# Patient Record
Sex: FEMALE | Race: WHITE
[De-identification: names, ages, dates, MRNs, and addresses within clinical notes are randomized per-mention and may not be internally consistent; named-entity substitution may affect disease eponyms.]

---

## 2017-01-14 ENCOUNTER — HOSPITAL ENCOUNTER (EMERGENCY)
Dept: HOSPITAL 62 - ER | Age: 32
Discharge: HOME | End: 2017-01-14
Payer: SELF-PAY

## 2017-01-14 VITALS — SYSTOLIC BLOOD PRESSURE: 95 MMHG | DIASTOLIC BLOOD PRESSURE: 64 MMHG
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
## 2017-01-14 DIAGNOSIS — M23.92: ICD-10-CM

## 2017-01-14 DIAGNOSIS — F17.200: ICD-10-CM

## 2017-01-14 DIAGNOSIS — Z98.51: ICD-10-CM

## 2017-01-14 DIAGNOSIS — S93.422A: Primary | ICD-10-CM
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
## 2017-01-14 DIAGNOSIS — M25.552: ICD-10-CM
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
## 2017-01-14 PROCEDURE — 99283 EMERGENCY DEPT VISIT LOW MDM: CPT

## 2017-01-14 PROCEDURE — 73562 X-RAY EXAM OF KNEE 3: CPT

## 2017-01-14 PROCEDURE — 73610 X-RAY EXAM OF ANKLE: CPT

## 2017-01-14 NOTE — ER DOCUMENT REPORT
HPI





- HPI


Patient complains to provider of: knee pain


Pain Level: 4


Context: 


Patient is a 31-year-old female presents emergency Department complaining of 

left hip knee and ankle pain.  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 

work 2 days ago.  States she had a twisting injury to her left knee as she was 

falling and landed on her left hip.  Patient states that she has a history of a 

torn meniscus in her left knee that she has never had surgery on.  She is 

unaware if it is either lateral or medial.  Patient states at home she has been 

taking Tylenol and using a heating pad with minimal improvement in her symptoms.











- CARDIOVASCULAR


Cardiovascular: DENIES: Chest pain





- REPRODUCTIVE


Reproductive: DENIES: Pregnant:





- DERM


Skin Color: Normal





Past Medical History





- General


Information source: Patient





- Social History


Smoking Status: Current Every Day Smoker


Chew tobacco use (# tins/day): No


Frequency of alcohol use: None


Drug Abuse: None


Family History: Reviewed & Not Pertinent


Patient has suicidal ideation: No


Patient has homicidal ideation: No





- Past Medical History


Cardiac Medical History: 


   Denies: Hx Pulmonary Embolism


Pulmonary Medical History: Reports: Hx Asthma - exercise induced


   Denies: Hx Sleep Apnea, Hx Tuberculosis


Renal/ Medical History: Reports: Hx Ovarian Cysts.  Denies: Hx Peritoneal 

Dialysis


GI Medical History: Reports: Hx Gastroesophageal Reflux Disease, Hx Ulcer


Musculoskeltal Medical History: Reports Hx Musculoskeletal Deformity, Reports 

Hx Musculoskeletal Trauma


Psychiatric Medical History: Reports: Hx Attention Deficit Hyperactivity 

Disorder, Hx Bipolar Disorder - age 10, Hx Personality Disorder, Hx Post 

Traumatic Stress Disorder, Hx Schizophrenia


   Denies: Hx Depression


Past Surgical History: Reports: Hx Breast Surgery, Hx Cardiac Surgery, Hx 

 Section - x2, Hx Tubal Ligation.  Denies: Hx Hysterectomy, Hx Pacemaker





- Immunizations


Immunizations up to date: Yes


Hx Diphtheria, Pertussis, Tetanus Vaccination: Yes - received in 


Hx Pneumococcal Vaccination: 12





Vertical Provider Document





- CONSTITUTIONAL


Agree With Documented VS: Yes


Exam Limitations: No Limitations


General Appearance: Mild Distress





- INFECTION CONTROL


TRAVEL OUTSIDE OF THE [place] IN LAST 30 DAYS: No





- HEENT


HEENT: Atraumatic, Normocephalic





- RESPIRATORY


O2 Sat by Pulse Oximetry: 97





- CARDIOVASCULAR


Pulses: Normal: Radial, Popliteal, Dorsalis pedis


Notes: 


cap refill less than 2 seconds in bilateral lower extremity digits





- BACK


Back: Normal Inspection - nontender





- MUSCULOSKELETAL/EXTREMETIES


Musculoskeletal/Extremeties: MAEW, FROM, Non-Tender - Tenderness to palpation 

with guarding.  Pain worse on the medial aspect of the left knee.  No pain to 

palpation of the ankle.  No pain to palpation of the hip


Notes: 


Able to ambulate with limping.  But able to bear weight.  Negative anterior and 

posterior drawer.  Negative varus stress but positive valgus stress of the 

medial aspect of the knee.  Positive Apley at the medial meniscus.  Minor joint 

swelling of the left knee





- NEURO


Level of Consciousness: Awake, Alert, Appropriate


Motor/Sensory: No Motor Deficit, No Sensory Deficit





- DERM


Integumentary: Warm, Dry, No Rash





Course





- Re-evaluation


Re-evalutation: 





17 13:41


Patient's history and physical exam reveal internal knee injury of the left 

knee.  Likely sprain of the left ankle.  Patient be discharged home with 

instruction for ice/heat as well as over-the-counter NSAIDs for pain management 

encouraged to take some time off work given the physical nature of her job as a 

dancer.  Encouraged to follow-up with orthopedics for further evaluation





- Vital Signs


Vital signs: 


 











Temp Pulse Resp BP Pulse Ox


 


 98.2 F   103 H  20   105/72   97 


 


 17 12:34  17 12:34  17 12:34  17 12:34  17 12:34














- Diagnostic Test


Radiology reviewed: Image reviewed, Reports reviewed





Discharge





- Discharge


Clinical Impression: 


Internal knee problem


Qualifiers:


 Laterality: left Qualified Code(s): M23.92 - Unspecified internal derangement 

of left knee





Ankle sprain


Qualifiers:


 Encounter type: initial encounter Involved ligament of ankle: deltoid ligament 

Laterality: left Qualified Code(s): S93.422A - Sprain of deltoid ligament of 

left ankle, initial encounter





Condition: Good


Disposition: HOME, SELF-CARE


Instructions:  Ice Packs ([facility]), Sprained Ankle ([facility]), Suspected Internal Knee 

Injury ([facility]), Use of Over-The-Counter Ibuprofen ([facility])


Forms:  Return to Work


Referrals: 


DEVORAH ARANA,  [ACTIVE STAFF] - Follow up as needed

## 2017-01-14 NOTE — ER DOCUMENT REPORT
ED Medical Screen (RME)





- General


Stated Complaint: LEG PAIN


Notes: 


Left lower extremity pain status post fall


TRAVEL OUTSIDE OF THE [place] IN LAST 30 DAYS: No





- Related Data


Allergies/Adverse Reactions: 


 





latex [Latex] Allergy (Severe, Verified 10/18/16 12:07)


 swelling,burning


trazodone [Trazodone] Allergy (Intermediate, Verified 10/18/16 12:07)


 Hives


ibuprofen Allergy (Verified 10/18/16 12:07)


 


tramadol [Tramadol] Allergy (Verified 10/18/16 12:07)


 


ondansetron HCl [From Zofran] Adverse Reaction (Intermediate, Verified 10/18/16 

12:07)


 GI upset


hydrocodone bitartrate [From Vicodin] Adverse Reaction (Verified 10/18/16 12:07)


 GI upset











Past Medical History





- Social History


Family history: Reviewed & Not Pertinent, Other - Her sisters all have 

dysmenorrhea





- Past Medical History


Cardiac Medical History: 


   Denies: Hx Pulmonary Embolism


Pulmonary Medical History: Reports: Hx Asthma - exercise induced


   Denies: Hx Sleep Apnea, Hx Tuberculosis


Renal/ Medical History: Reports: Hx Ovarian Cysts


GI Medical History: Reports: Hx Gastroesophageal Reflux Disease, Hx Ulcer


Musculoskeltal Medical History: Reports Hx Musculoskeletal Deformity, Reports 

Hx Musculoskeletal Trauma


Psychiatric Medical History: Reports: Hx Attention Deficit Hyperactivity 

Disorder, Hx Bipolar Disorder - age 10, Hx Personality Disorder, Hx Post 

Traumatic Stress Disorder, Hx Schizophrenia


   Denies: Hx Depression


Past Surgical History: Reports: Hx Breast Surgery, Hx Cardiac Surgery, Hx 

 Section - x2, Hx Tubal Ligation.  Denies: Hx Hysterectomy, Hx Pacemaker





- Immunizations


Immunizations up to date: Yes


Hx Diphtheria, Pertussis, Tetanus Vaccination: Yes - received in

## 2017-01-15 ENCOUNTER — HOSPITAL ENCOUNTER (EMERGENCY)
Dept: HOSPITAL 62 - ER | Age: 32
Discharge: HOME | End: 2017-01-15
Payer: SELF-PAY

## 2017-01-15 VITALS — DIASTOLIC BLOOD PRESSURE: 71 MMHG | SYSTOLIC BLOOD PRESSURE: 109 MMHG

## 2017-01-15 DIAGNOSIS — Z91.040: ICD-10-CM

## 2017-01-15 DIAGNOSIS — Z88.5: ICD-10-CM

## 2017-01-15 DIAGNOSIS — Z87.42: ICD-10-CM

## 2017-01-15 DIAGNOSIS — N93.8: Primary | ICD-10-CM

## 2017-01-15 DIAGNOSIS — J45.909: ICD-10-CM

## 2017-01-15 DIAGNOSIS — R10.2: ICD-10-CM

## 2017-01-15 DIAGNOSIS — Z88.8: ICD-10-CM

## 2017-01-15 DIAGNOSIS — Z98.51: ICD-10-CM

## 2017-01-15 LAB
ALBUMIN SERPL-MCNC: 3.8 G/DL (ref 3.5–5)
ALP SERPL-CCNC: 58 U/L (ref 38–126)
ALT SERPL-CCNC: 15 U/L (ref 9–52)
ANION GAP SERPL CALC-SCNC: 10 MMOL/L (ref 5–19)
APPEARANCE UR: (no result)
AST SERPL-CCNC: 16 U/L (ref 14–36)
BASOPHILS # BLD AUTO: 0.1 10^3/UL (ref 0–0.2)
BASOPHILS NFR BLD AUTO: 0.9 % (ref 0–2)
BILIRUB DIRECT SERPL-MCNC: 0 MG/DL (ref 0–0.3)
BILIRUB SERPL-MCNC: 0.3 MG/DL (ref 0.2–1.3)
BILIRUB UR QL STRIP: NEGATIVE
BUN SERPL-MCNC: 14 MG/DL (ref 7–20)
CALCIUM: 9.1 MG/DL (ref 8.4–10.2)
CHLORIDE SERPL-SCNC: 107 MMOL/L (ref 98–107)
CO2 SERPL-SCNC: 27 MMOL/L (ref 22–30)
CREAT SERPL-MCNC: 0.92 MG/DL (ref 0.52–1.25)
EOSINOPHIL # BLD AUTO: 0.1 10^3/UL (ref 0–0.6)
EOSINOPHIL NFR BLD AUTO: 1.4 % (ref 0–6)
ERYTHROCYTE [DISTWIDTH] IN BLOOD BY AUTOMATED COUNT: 13.9 % (ref 11.5–14)
GLUCOSE SERPL-MCNC: 91 MG/DL (ref 75–110)
GLUCOSE UR STRIP-MCNC: NEGATIVE MG/DL
HCT VFR BLD CALC: 39.9 % (ref 36–47)
HGB BLD-MCNC: 12.6 G/DL (ref 12–15.5)
HGB HCT DIFFERENCE: -2.1
KETONES UR STRIP-MCNC: NEGATIVE MG/DL
LYMPHOCYTES # BLD AUTO: 2.4 10^3/UL (ref 0.5–4.7)
LYMPHOCYTES NFR BLD AUTO: 32.8 % (ref 13–45)
MCH RBC QN AUTO: 29.8 PG (ref 27–33.4)
MCHC RBC AUTO-ENTMCNC: 31.7 G/DL (ref 32–36)
MCV RBC AUTO: 94 FL (ref 80–97)
MONOCYTES # BLD AUTO: 0.5 10^3/UL (ref 0.1–1.4)
MONOCYTES NFR BLD AUTO: 7 % (ref 3–13)
NEUTROPHILS # BLD AUTO: 4.2 10^3/UL (ref 1.7–8.2)
NEUTS SEG NFR BLD AUTO: 57.9 % (ref 42–78)
NITRITE UR QL STRIP: NEGATIVE
PH UR STRIP: 8 [PH] (ref 5–9)
POTASSIUM SERPL-SCNC: 4.7 MMOL/L (ref 3.6–5)
PROT SERPL-MCNC: 6.7 G/DL (ref 6.3–8.2)
PROT UR STRIP-MCNC: NEGATIVE MG/DL
RBC # BLD AUTO: 4.24 10^6/UL (ref 3.72–5.28)
SODIUM SERPL-SCNC: 144.1 MMOL/L (ref 137–145)
SP GR UR STRIP: 1.02
UROBILINOGEN UR-MCNC: NEGATIVE MG/DL (ref ?–2)
WBC # BLD AUTO: 7.3 10^3/UL (ref 4–10.5)

## 2017-01-15 PROCEDURE — 80053 COMPREHEN METABOLIC PANEL: CPT

## 2017-01-15 PROCEDURE — 81001 URINALYSIS AUTO W/SCOPE: CPT

## 2017-01-15 PROCEDURE — 84702 CHORIONIC GONADOTROPIN TEST: CPT

## 2017-01-15 PROCEDURE — 81025 URINE PREGNANCY TEST: CPT

## 2017-01-15 PROCEDURE — 85025 COMPLETE CBC W/AUTO DIFF WBC: CPT

## 2017-01-15 PROCEDURE — 99284 EMERGENCY DEPT VISIT MOD MDM: CPT

## 2017-01-15 PROCEDURE — 36415 COLL VENOUS BLD VENIPUNCTURE: CPT

## 2017-01-15 NOTE — ER DOCUMENT REPORT
ED GI/





- General


Chief Complaint: Vaginal Bleeding


Stated Complaint: VAGINAL BLEEDING,NAUSEA,VOMITING


Time seen by provider: 19:17


TRAVEL OUTSIDE OF THE [place] IN LAST 30 DAYS: No





- HPI


Patient complains to provider of: Pelvic pain, Vaginal bleeding - pt is  

with heavy vaginal bleeding over the past day and pelvic pain with h/o 

endometriosis





- Related Data


Allergies/Adverse Reactions: 


 





latex [Latex] Allergy (Severe, Verified 17 12:34)


 swelling,burning


trazodone [Trazodone] Allergy (Intermediate, Verified 17 12:34)


 Hives


ibuprofen Allergy (Verified 17 12:34)


 


tramadol [Tramadol] Allergy (Verified 17 12:34)


 


ondansetron HCl [From Zofran] Adverse Reaction (Intermediate, Verified 17 

12:34)


 GI upset


hydrocodone bitartrate [From Vicodin] Adverse Reaction (Verified 17 12:34)


 GI upset











Past Medical History





- General


Information source: Patient





- Social History


Smoking Status: Unknown if Ever Smoked


Cigarette use (# per day): No


Chew tobacco use (# tins/day): No


Smoking Education Provided: No


Family History: Reviewed & Not Pertinent


Patient has suicidal ideation: No


Patient has homicidal ideation: No





- Past Medical History


Cardiac Medical History: 


   Denies: Hx Pulmonary Embolism


Pulmonary Medical History: Reports: Hx Asthma - exercise induced


   Denies: Hx Sleep Apnea, Hx Tuberculosis


Renal/ Medical History: Reports: Hx Ovarian Cysts.  Denies: Hx Peritoneal 

Dialysis


GI Medical History: Reports: Hx Gastroesophageal Reflux Disease, Hx Ulcer


Musculoskeltal Medical History: Reports Hx Musculoskeletal Deformity, Reports 

Hx Musculoskeletal Trauma


Psychiatric Medical History: Reports: Hx Attention Deficit Hyperactivity 

Disorder, Hx Bipolar Disorder - age 10, Hx Personality Disorder, Hx Post 

Traumatic Stress Disorder, Hx Schizophrenia


   Denies: Hx Depression


Past Surgical History: Reports: Hx Breast Surgery, Hx Cardiac Surgery, Hx 

 Section - x2, Hx Tubal Ligation.  Denies: Hx Hysterectomy, Hx Pacemaker





- Immunizations


Immunizations up to date: Yes


Hx Diphtheria, Pertussis, Tetanus Vaccination: Yes - received in 


Hx Pneumococcal Vaccination: 12





Review of Systems





- Review of Systems


Constitutional: No symptoms reported


EENT: No symptoms reported


Cardiovascular: No symptoms reported


Gastrointestinal: No symptoms reported


Female Genitourinary: See HPI, Vaginal bleeding


-: Yes All other systems reviewed and negative





Physical Exam





- Vital signs


Vitals: 


 











Temp Pulse Resp BP Pulse Ox


 


 98.0 F   84   18   99/65 L  100 


 


 01/15/17 18:25  01/15/17 18:25  01/15/17 18:25  01/15/17 18:25  01/15/17 18:25














- General


General appearance: Appears well


In distress: Mild





- Respiratory


Respiratory status: No respiratory distress


Breath sounds: Normal





- Cardiovascular


Rhythm: Regular


Heart sounds: Normal auscultation





- Abdominal


Inspection: Normal


Bowel sounds: Normal


Tenderness: Nontender





- Genitourinary


External exam: Normal


Speculum exam: Cervix closed, Other - there is a small amount of blood in the 

posterior fornix


Vaginal bleeding: None


Bimanuel exam: Normal.  No: Cervical motion tender, Adnexal tenderness





Course





- Vital Signs


Vital signs: 


 











Temp Pulse Resp BP Pulse Ox


 


 98.0 F   84   18   99/65 L  100 


 


 01/15/17 18:25  01/15/17 18:25  01/15/17 18:25  01/15/17 18:25  01/15/17 18:25














- Laboratory


Result Diagrams: 


 01/15/17 18:40





 01/15/17 18:40


Laboratory results interpreted by me: 


 











  01/15/17





  18:40


 


MCHC  31.7 L














Discharge





- Discharge


Clinical Impression: 


 Dysfunctional uterine bleeding





Condition: Stable


Disposition: HOME, SELF-CARE


Instructions:  Pelvic Pain ([facility])


Additional Instructions: 


rest, take meds as prescribed, return if worse


Prescriptions: 


Oxycodone HCl/Acetaminophen [Percocet 5-325 mg Tablet] 1 tab PO ASDIR PRN #[id number] 

tab


 PRN Reason: 


Referrals: 


MISTY SONG MD [ACTIVE STAFF] - Follow up as needed

## 2017-01-15 NOTE — ER DOCUMENT REPORT
ED Medical Screen (RME)





- General


Chief Complaint: Pelvic Pain


Stated Complaint: VAGINAL BLEEDING,NAUSEA,VOMITING


Notes: 


Vaginal bleeding nausea and vomiting intermittently 5 days


TRAVEL OUTSIDE OF THE [place] IN LAST 30 DAYS: No





- Related Data


Allergies/Adverse Reactions: 


 





latex [Latex] Allergy (Severe, Verified 17 12:34)


 swelling,burning


trazodone [Trazodone] Allergy (Intermediate, Verified 17 12:34)


 Hives


ibuprofen Allergy (Verified 17 12:34)


 


tramadol [Tramadol] Allergy (Verified 17 12:34)


 


ondansetron HCl [From Zofran] Adverse Reaction (Intermediate, Verified 17 

12:34)


 GI upset


hydrocodone bitartrate [From Vicodin] Adverse Reaction (Verified 17 12:34)


 GI upset











Past Medical History





- Social History


Family history: Reviewed & Not Pertinent, Other - Her sisters all have 

dysmenorrhea





- Past Medical History


Cardiac Medical History: 


   Denies: Hx Pulmonary Embolism


Pulmonary Medical History: Reports: Hx Asthma - exercise induced


   Denies: Hx Sleep Apnea, Hx Tuberculosis


Renal/ Medical History: Reports: Hx Ovarian Cysts.  Denies: Hx Peritoneal 

Dialysis


GI Medical History: Reports: Hx Gastroesophageal Reflux Disease, Hx Ulcer


Musculoskeltal Medical History: Reports Hx Musculoskeletal Deformity, Reports 

Hx Musculoskeletal Trauma


Psychiatric Medical History: Reports: Hx Attention Deficit Hyperactivity 

Disorder, Hx Bipolar Disorder - age 10, Hx Personality Disorder, Hx Post 

Traumatic Stress Disorder, Hx Schizophrenia


   Denies: Hx Depression


Past Surgical History: Reports: Hx Breast Surgery, Hx Cardiac Surgery, Hx 

 Section - x2, Hx Tubal Ligation.  Denies: Hx Hysterectomy, Hx Pacemaker





- Immunizations


Immunizations up to date: Yes


Hx Diphtheria, Pertussis, Tetanus Vaccination: Yes - received in 





Physical Exam





- Vital signs


Vitals: 





 











Temp Pulse Resp BP Pulse Ox


 


 98.0 F   84   18   99/65 L  100 


 


 01/15/17 18:25  01/15/17 18:25  01/15/17 18:25  01/15/17 18:25  01/15/17 18:25














Course





- Vital Signs


Vital signs: 





 











Temp Pulse Resp BP Pulse Ox


 


 98.0 F   84   18   99/65 L  100 


 


 01/15/17 18:25  01/15/17 18:25  01/15/17 18:25  01/15/17 18:25  01/15/17 18:25

## 2017-01-22 ENCOUNTER — HOSPITAL ENCOUNTER (EMERGENCY)
Dept: HOSPITAL 62 - ER | Age: 32
Discharge: HOME | End: 2017-01-22
Payer: SELF-PAY

## 2017-01-22 VITALS — SYSTOLIC BLOOD PRESSURE: 101 MMHG | DIASTOLIC BLOOD PRESSURE: 62 MMHG

## 2017-01-22 DIAGNOSIS — Z98.51: ICD-10-CM

## 2017-01-22 DIAGNOSIS — N93.9: ICD-10-CM

## 2017-01-22 DIAGNOSIS — N76.0: Primary | ICD-10-CM

## 2017-01-22 DIAGNOSIS — N80.9: ICD-10-CM

## 2017-01-22 DIAGNOSIS — Z91.040: ICD-10-CM

## 2017-01-22 DIAGNOSIS — Z71.6: ICD-10-CM

## 2017-01-22 DIAGNOSIS — Z88.5: ICD-10-CM

## 2017-01-22 DIAGNOSIS — N39.0: ICD-10-CM

## 2017-01-22 DIAGNOSIS — Z88.6: ICD-10-CM

## 2017-01-22 DIAGNOSIS — B96.89: ICD-10-CM

## 2017-01-22 DIAGNOSIS — Z87.42: ICD-10-CM

## 2017-01-22 DIAGNOSIS — J45.909: ICD-10-CM

## 2017-01-22 DIAGNOSIS — R10.2: ICD-10-CM

## 2017-01-22 DIAGNOSIS — Z88.8: ICD-10-CM

## 2017-01-22 DIAGNOSIS — Z87.19: ICD-10-CM

## 2017-01-22 DIAGNOSIS — F17.210: ICD-10-CM

## 2017-01-22 LAB
ALBUMIN SERPL-MCNC: 4.5 G/DL (ref 3.5–5)
ALP SERPL-CCNC: 74 U/L (ref 38–126)
ALT SERPL-CCNC: 17 U/L (ref 9–52)
ANION GAP SERPL CALC-SCNC: 10 MMOL/L (ref 5–19)
APPEARANCE UR: (no result)
AST SERPL-CCNC: 16 U/L (ref 14–36)
BASOPHILS # BLD AUTO: 0.1 10^3/UL (ref 0–0.2)
BASOPHILS NFR BLD AUTO: 0.6 % (ref 0–2)
BILIRUB DIRECT SERPL-MCNC: 0 MG/DL (ref 0–0.3)
BILIRUB SERPL-MCNC: 0.5 MG/DL (ref 0.2–1.3)
BILIRUB UR QL STRIP: NEGATIVE
BUN SERPL-MCNC: 8 MG/DL (ref 7–20)
CALCIUM: 9.9 MG/DL (ref 8.4–10.2)
CHLAM PCR: NOT DETECTED
CHLORIDE SERPL-SCNC: 102 MMOL/L (ref 98–107)
CO2 SERPL-SCNC: 30 MMOL/L (ref 22–30)
CREAT SERPL-MCNC: 0.73 MG/DL (ref 0.52–1.25)
EOSINOPHIL # BLD AUTO: 0.1 10^3/UL (ref 0–0.6)
EOSINOPHIL NFR BLD AUTO: 0.7 % (ref 0–6)
ERYTHROCYTE [DISTWIDTH] IN BLOOD BY AUTOMATED COUNT: 14.3 % (ref 11.5–14)
GLUCOSE SERPL-MCNC: 117 MG/DL (ref 75–110)
GLUCOSE UR STRIP-MCNC: NEGATIVE MG/DL
HCT VFR BLD CALC: 43.1 % (ref 36–47)
HGB BLD-MCNC: 14 G/DL (ref 12–15.5)
HGB HCT DIFFERENCE: -1.1
KETONES UR STRIP-MCNC: NEGATIVE MG/DL
LYMPHOCYTES # BLD AUTO: 2.1 10^3/UL (ref 0.5–4.7)
LYMPHOCYTES NFR BLD AUTO: 18.2 % (ref 13–45)
MCH RBC QN AUTO: 30.3 PG (ref 27–33.4)
MCHC RBC AUTO-ENTMCNC: 32.4 G/DL (ref 32–36)
MCV RBC AUTO: 93 FL (ref 80–97)
MONOCYTES # BLD AUTO: 0.5 10^3/UL (ref 0.1–1.4)
MONOCYTES NFR BLD AUTO: 4.1 % (ref 3–13)
NEUTROPHILS # BLD AUTO: 8.6 10^3/UL (ref 1.7–8.2)
NEUTS SEG NFR BLD AUTO: 76.4 % (ref 42–78)
NITRITE UR QL STRIP: POSITIVE
PH UR STRIP: 6 [PH] (ref 5–9)
POTASSIUM SERPL-SCNC: 4.6 MMOL/L (ref 3.6–5)
PROT SERPL-MCNC: 7.1 G/DL (ref 6.3–8.2)
PROT UR STRIP-MCNC: 30 MG/DL
RBC # BLD AUTO: 4.62 10^6/UL (ref 3.72–5.28)
SODIUM SERPL-SCNC: 142.3 MMOL/L (ref 137–145)
SP GR UR STRIP: 1.01
UROBILINOGEN UR-MCNC: NEGATIVE MG/DL (ref ?–2)
WBC # BLD AUTO: 11.3 10^3/UL (ref 4–10.5)

## 2017-01-22 PROCEDURE — 93976 VASCULAR STUDY: CPT

## 2017-01-22 PROCEDURE — 36415 COLL VENOUS BLD VENIPUNCTURE: CPT

## 2017-01-22 PROCEDURE — 87210 SMEAR WET MOUNT SALINE/INK: CPT

## 2017-01-22 PROCEDURE — 84703 CHORIONIC GONADOTROPIN ASSAY: CPT

## 2017-01-22 PROCEDURE — 87491 CHLMYD TRACH DNA AMP PROBE: CPT

## 2017-01-22 PROCEDURE — 99284 EMERGENCY DEPT VISIT MOD MDM: CPT

## 2017-01-22 PROCEDURE — 87591 N.GONORRHOEAE DNA AMP PROB: CPT

## 2017-01-22 PROCEDURE — 80053 COMPREHEN METABOLIC PANEL: CPT

## 2017-01-22 PROCEDURE — 76830 TRANSVAGINAL US NON-OB: CPT

## 2017-01-22 PROCEDURE — 81001 URINALYSIS AUTO W/SCOPE: CPT

## 2017-01-22 PROCEDURE — 85025 COMPLETE CBC W/AUTO DIFF WBC: CPT

## 2017-01-22 NOTE — ER DOCUMENT REPORT
ED Medical Screen (RME)





- General


Chief Complaint: Vaginal Pain


Stated Complaint: VAGINAL PAIN


Mode of Arrival: Ambulatory


Information source: Patient


Notes: 


31-year-old female presents to the emergency department complaining of 

intermittent persistent vaginal bleeding and pelvic pain for approximately the 

last 2 weeks.  Which was seen in this emergency department one week ago for 

same symptoms but they have persisted.  States has appointment with her OB/GYN 

on Thursday but could not wait until then.








I have greeted and performed a rapid initial assessment of this patient. A 

comprehensive ED assessment and evaluation of the patient, analysis of test 

results and completion of the medical decision making process will be conducted 

by additional ED providers.


TRAVEL OUTSIDE OF THE [place] IN LAST 30 DAYS: No





- Related Data


Allergies/Adverse Reactions: 


 





latex [Latex] Allergy (Severe, Verified 17 14:50)


 swelling,burning


trazodone [Trazodone] Allergy (Intermediate, Verified 17 14:50)


 Hives


ibuprofen Allergy (Verified 17 14:50)


 


tramadol [Tramadol] Allergy (Verified 17 14:50)


 


ondansetron HCl [From Zofran] Adverse Reaction (Intermediate, Verified 17 

14:50)


 GI upset


hydrocodone bitartrate [From Vicodin] Adverse Reaction (Verified 17 14:50)


 GI upset











Past Medical History





- Social History


Frequency of alcohol use: Social


Drug Abuse: None


Family history: Reviewed & Not Pertinent, Other - Her sisters all have 

dysmenorrhea





- Past Medical History


Cardiac Medical History: 


   Denies: Hx Pulmonary Embolism


Pulmonary Medical History: Reports: Hx Asthma - exercise induced


   Denies: Hx Sleep Apnea, Hx Tuberculosis


Renal/ Medical History: Reports: Hx Ovarian Cysts.  Denies: Hx Peritoneal 

Dialysis


GI Medical History: Reports: Hx Gastroesophageal Reflux Disease, Hx Ulcer


Musculoskeltal Medical History: Reports Hx Musculoskeletal Deformity, Reports 

Hx Musculoskeletal Trauma


Psychiatric Medical History: Reports: Hx Attention Deficit Hyperactivity 

Disorder, Hx Bipolar Disorder - age 10, Hx Personality Disorder, Hx Post 

Traumatic Stress Disorder, Hx Schizophrenia


   Denies: Hx Depression


Past Surgical History: Reports: Hx Breast Surgery, Hx Cardiac Surgery, Hx 

 Section - x2, Hx Tubal Ligation.  Denies: Hx Hysterectomy, Hx Pacemaker





- Immunizations


Immunizations up to date: Yes


Hx Diphtheria, Pertussis, Tetanus Vaccination: Yes - received in 





Physical Exam





- Vital signs


Vitals: 





 











Temp Pulse Resp BP Pulse Ox


 


 98.4 F   96   21 H  106/69   100 


 


 17 14:26  17 14:17 14:17 14:17 14:26














- General


General appearance: Appears well, Alert


In distress: None





- Respiratory


Respiratory status: No respiratory distress





Course





- Vital Signs


Vital signs: 





 











Temp Pulse Resp BP Pulse Ox


 


 98.4 F   96   21 H  106/69   100 


 


 17 14:26  17 14:17 14:17 14:17 14:26

## 2017-01-22 NOTE — ER DOCUMENT REPORT
ED GI/





- General


Chief Complaint: Vaginal Pain


Stated Complaint: VAGINAL PAIN


Time seen by provider: 16:47


Mode of Arrival: Ambulatory


Information source: Patient


Notes: 


31-year-old female presents to ED for pelvic pain for the last 2 weeks with 

vaginal bleeding.  She states she has a history of endometriosis and ovarian 

cyst with irregular bleeding.  She states sometimes her.  Her last 3 weeks and 

sometimes it'll last 1 week.  She has an appointment with Dr. Mera for an OB/

GYN appointment a week from this, and Thursday.


TRAVEL OUTSIDE OF THE [place] IN LAST 30 DAYS: No





- HPI


Patient complains to provider of: Pelvic pain, Vaginal pain


Onset: Other


Quality of pain: Sharp - 2 weeks, Stabbing, Throbbing


Severity at maximum: Severe


Severity in ED: Severe


Pain Level: 4


Location: Pelvis, Vaginal


Vaginal bleeding (Compared to normal period): Lighter


Menstrual period history: Abnormal


Associated symptoms: Nausea


Exacerbated by: Movement, Walking


Relieved by: Denies


Similar symptoms previously: Yes


Recently seen / treated by doctor: No





- Related Data


Allergies/Adverse Reactions: 


 





latex [Latex] Allergy (Severe, Verified 17 14:50)


 swelling,burning


trazodone [Trazodone] Allergy (Intermediate, Verified 17 14:50)


 Hives


ibuprofen Allergy (Verified 17 14:50)


 


tramadol [Tramadol] Allergy (Verified 17 14:50)


 


ondansetron HCl [From Zofran] Adverse Reaction (Intermediate, Verified 17 

14:50)


 GI upset


hydrocodone bitartrate [From Vicodin] Adverse Reaction (Verified 17 14:50)


 GI upset











Past Medical History





- General


Information source: Patient





- Social History


Smoking Status: Current Every Day Smoker


Cigarette use (# per day): Yes


Chew tobacco use (# tins/day): No


Smoking Education Provided: Yes - less than 2 minutes


Frequency of alcohol use: Social


Drug Abuse: None


Family History: Arthritis, CAD, DM, Hyperlipidemia, Hypertension, Malignancy, 

Thyroid Disfunction


Patient has suicidal ideation: No


Patient has homicidal ideation: No





- Past Medical History


Cardiac Medical History: Reports: None


Pulmonary Medical History: Reports: Hx Asthma - exercise induced


EENT Medical History: Reports: None


Neurological Medical History: Reports: None


Endocrine Medical History: Reports: None


Renal/ Medical History: Reports: Hx Ovarian Cysts - PCOS, Other - 

Endometriosis


Malignancy Medical History: Reports: None


GI Medical History: Reports: Hx Gastroesophageal Reflux Disease, Hx Ulcer


Musculoskeltal Medical History: Reports Hx Musculoskeletal Deformity - Scoliosis

, Reports Hx Musculoskeletal Trauma - Sprains of knee and hip


Skin Medical History: Reports None


Psychiatric Medical History: Reports: Hx Attention Deficit Hyperactivity 

Disorder, Hx Bipolar Disorder - age 10, Hx Personality Disorder, Hx Post 

Traumatic Stress Disorder, Hx Schizophrenia


Traumatic Medical History: Reports: None


Infectious Medical History: Reports: None


Past Surgical History: Reports: Hx Breast Surgery, Hx  Section - x2, Hx 

Tubal Ligation





- Immunizations


Immunizations up to date: Yes


Hx Diphtheria, Pertussis, Tetanus Vaccination: Yes - received in 


Hx Pneumococcal Vaccination: 12





Review of Systems





- Review of Systems


Constitutional: No symptoms reported


EENT: No symptoms reported


Cardiovascular: No symptoms reported


Respiratory: No symptoms reported


Gastrointestinal: No symptoms reported


Genitourinary: No symptoms reported


Female Genitourinary: Heavy/abnormal periods, Vaginal bleeding, Other - Pelvic 

pain and vaginal pain


Musculoskeletal: No symptoms reported


Skin: No symptoms reported


Hematologic/Lymphatic: No symptoms reported


Neurological/Psychological: No symptoms reported


-: Yes All other systems reviewed and negative





Physical Exam





- Vital signs


Vitals: 


 











Temp Pulse Resp BP Pulse Ox


 


 98.4 F   96   21 H  106/69   100 


 


 17 14:26  17 14:26  17 14:26  17 14:26  17 14:26











Interpretation: Normal





- General


General appearance: Appears well, Alert





- HEENT


Head: Normocephalic, Atraumatic


Eyes: Normal


Pupils: PERRL





- Respiratory


Respiratory status: No respiratory distress


Chest status: Nontender


Breath sounds: Normal


Chest palpation: Normal





- Cardiovascular


Rhythm: Regular


Heart sounds: Normal auscultation


Murmur: No





- Abdominal


Inspection: Normal


Distension: No distension


Bowel sounds: Normal


Tenderness: Tender - Bilateral pelvic pain


Organomegaly: No organomegaly





- Genitourinary


External exam: Normal


Speculum exam: Cervix closed


Vaginal bleeding: Mild


Bimanuel exam: Cervical motion tender, Adnexal tenderness - Left





- Back


Back: Normal, Nontender





- Extremities


General upper extremity: Normal inspection, Nontender, Normal color, Normal ROM

, Normal temperature


General lower extremity: Normal inspection, Nontender, Normal color, Normal ROM

, Normal temperature, Normal weight bearing.  No: Esteban's sign





- Neurological


Neuro grossly intact: Yes


Cognition: Normal


Orientation: AAOx4


Simi Coma Scale Eye Opening: Spontaneous


Wolcott Coma Scale Verbal: Oriented


Wolcott Coma Scale Motor: Obeys Commands


Simi Coma Scale Total: 15


Speech: Normal


Motor strength normal: LUE, RUE, LLE, RLE


Sensory: Normal





- Psychological


Associated symptoms: Normal affect, Normal mood





- Skin


Skin Temperature: Warm


Skin Moisture: Dry


Skin Color: Normal





Course





- Re-evaluation


Re-evalutation: 





17 19:40


Discussed ultrasound and labs with patient patient will be discharged home with 

prescriptions for Flagyl and Macrobid naproxen and Phenergan.  Patient has had 

naproxen and Phenergan Flagyl and Macrobid in the emergency room.





- Vital Signs


Vital signs: 


 











Temp Pulse Resp BP Pulse Ox


 


 98.0 F   82   16   101/62   96 


 


 17 19:40  17 19:40  17 19:40  17 19:40  17 19:40














- Laboratory


Result Diagrams: 


 17 14:50





 17 14:50


Laboratory results interpreted by me: 


 











  17





  14:50 14:50 14:50


 


WBC  11.3 H  


 


RDW  14.3 H  


 


Absolute Neutrophils  8.6 H  


 


Glucose   117 H 


 


Urine Protein    30 H


 


Urine Blood    LARGE H


 


Urine Nitrite    POSITIVE H


 


Ur Leukocyte Esterase    LARGE H














- Diagnostic Test


Radiology reviewed: Image reviewed, Reports reviewed





Discharge





- Discharge


Clinical Impression: 


 Bacterial vaginosis, Vaginal bleeding, Pelvic pain





UTI (urinary tract infection)


Qualifiers:


 Urinary tract infection type: site unspecified Hematuria presence: with 

hematuria Qualified Code(s): N39.0 - Urinary tract infection, site not specified





Condition: Stable


Disposition: HOME, SELF-CARE


Instructions:  Family Physicians / Practices


Additional Instructions: 


URINARY TRACT INFECTION:





     Your evaluation indicates that you have a urinary tract infection. This is 

due to germs growing in the bladder.  This is a common problem.


     This infection usually responds quickly to antibiotics.  Your antibiotic 

should be taken exactly as prescribed.  Drink plenty of fluids -- three to four 

quarts a day.


     Occasionally, a bladder anesthetic will be prescribed to help stop the 

feeling of urgency until the antibiotic has a chance to clear the infection.  

This may cause your urine to be dark orange.


     Certain urine infections require a culture.  If the doctor obtained a 

culture, the results will be back in two days.  You should call to see if a 

change in treatment is needed.


     A repeat urinalysis after you finish treatment is often recommended.  The 

physician will let you know if further testing is required.


     Call the doctor if you develop fever, chills, flank pain, inability to 

urinate, or blood in the urine.





VAGINOSIS, BACTERIAL:





     Your exam shows you have bacterial vaginosis. This condition is due to an 

overgrowth of bacteria in the vagina. Symptoms may include vaginal itching or 

pain, a smelly discharge, and sometimes burning with urination. Normally this 

is not transmitted by sexual contact.


     Vaginosis can be treated with oral or topical antibiotics. Metronidazole (

Flagyl) pills are usually effective. Topical vaginal creams include Cleocin and 

Metro-Gel. You should avoid sexual contact until your symptoms are all better.


     Call the doctor if you develop pelvic pain, fever, or problems with 

urination, or if you don't improve as expected.





METRONIDAZOLE:


     Metronidazole (Flagyl) has been prescribed.  This medication is used to 

kill a type of bacteria called anaerobes, and protozoan parasites such as 

trichomonas and Giardia.


     Flagyl often causes a metallic taste in the mouth and mild nausea.


     Do not use alcohol in any form with Flagyl (including alcohol in 

medication elixirs).  Flagyl interacts with alcohol to cause flushing, 

palpitations, headache, stomach cramps, and vomiting.  Do not use Flagyl if you 

are taking Antabuse (disulfiram).


     Call the doctor at once if you develop rash, shortness of breath, itching, 

or lightheadedness.





NITROFURANTOIN (MACRODANTIN, MACROBID):


     You have received a prescription for nitrofurantoin (Macrodantin). This 

antibiotic is used for urinary tract infections.





Women who are pregnant or nursing should notify the physician before taking 

this medicine.  If you have ever had a problem caused by this medication in the 

past, be sure the physician is aware of it.


     Common side effects of this medicine include nausea, vomiting, or 

decreased appetite.  Notify your physician if these side effects become severe.


     Immediately stop this medicine and call the physician if you develop cough

, shortness of breath, chest pain, weakness, jaundice (yellow color of the skin 

and whites of the eyes), or a skin rash.





Antinausea Medication





     You have been given a medication to suppress nausea and vomiting. This 

type of medication can be given as a shot, pill, or suppository. It will 

usually last for many hours.  Pills and shots usually last six to eight hours, 

suppositories last about 12 hours.  For the typical illness, only one or two 

doses of the medication may be necessary.


     Mild lightheadedness may occur.  This type of medicine can cause 

drowsiness.  Do not drive or operate dangerous machinery while under its 

influence.  Do not mix with alcohol.


     See your doctor at once if you have muscle spasms or tightness, or 

uncontrollable motions (particularly of the neck, mouth, or jaw). Persistent 

vomiting or severe lightheadedness should also be evaluated by the physician.





FOLLOW-UP CARE:


If you have been referred to a physician for follow-up care, call the physician

s office for an appointment as you were instructed or within the next two days.

  If you experience worsening or a significant change in your symptoms, notify 

the physician immediately or return to the Emergency Department at any time for 

re-evaluation.











Prescriptions: 


Promethazine HCl [Phenergan 25 mg Tablet] 25 mg PO Q6HP PRN #14 tablet


 PRN Reason: 


Metronidazole [Flagyl 500 mg Tablet] 500 mg PO BID #14 tablet


Naproxen 500 mg PO BIDP PRN #14 tablet


 PRN Reason: 


Nitrofurantoin Monohyd/M-Cryst [Macrobid 100 mg Capsule] 100 mg PO BID #14 

capsule


Forms:  Smoking Cessation Education

## 2017-02-12 ENCOUNTER — HOSPITAL ENCOUNTER (EMERGENCY)
Dept: HOSPITAL 62 - ER | Age: 32
Discharge: LEFT BEFORE DISCHARGE | End: 2017-02-12
Payer: COMMERCIAL

## 2017-02-12 VITALS — DIASTOLIC BLOOD PRESSURE: 65 MMHG | SYSTOLIC BLOOD PRESSURE: 105 MMHG

## 2017-02-12 DIAGNOSIS — M25.512: Primary | ICD-10-CM

## 2017-02-12 DIAGNOSIS — M25.562: ICD-10-CM
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
## 2017-02-12 DIAGNOSIS — M25.561: ICD-10-CM

## 2017-02-12 DIAGNOSIS — M54.9: ICD-10-CM

## 2017-02-12 PROCEDURE — 99281 EMR DPT VST MAYX REQ PHY/QHP: CPT

## 2017-02-12 PROCEDURE — 81025 URINE PREGNANCY TEST: CPT

## 2017-02-12 NOTE — ER DOCUMENT REPORT
ED Medical Screen (RME)





- General


Chief Complaint: Fall


Stated Complaint: FALL/BACK,LEFT SHOULDER,HIP PAIN


Time seen by provider: 18:00


Mode of Arrival: Ambulatory


Information source: Patient


Notes: 


31-year-old female presents to ED for pain in her back left shoulder and both 

knees after falling off of a dancing pole at work.  States she grabbed herself 

with her left hand and that's why her shoulder hurts and that she fell off and 

on for her knees and back hurts.  Last menstrual period was in 2016.  

She states she has endometriosis and ovarian cyst and is irregular.














I have greeted and performed a rapid initial assessment of this patient.  A 

comprehensive ED assessment and evaluation of the patient, analysis of test 

results and completion of medical decision making process will be conducted by 

an additional ED providers.


TRAVEL OUTSIDE OF THE [place] IN LAST 30 DAYS: No





- Related Data


Allergies/Adverse Reactions: 


 





latex [Latex] Allergy (Severe, Verified 17 14:50)


 swelling,burning


trazodone [Trazodone] Allergy (Intermediate, Verified 17 14:50)


 Hives


ibuprofen Allergy (Verified 17 14:50)


 


tramadol [Tramadol] Allergy (Verified 17 14:50)


 


ondansetron HCl [From Zofran] Adverse Reaction (Intermediate, Verified 17 

14:50)


 GI upset


hydrocodone bitartrate [From Vicodin] Adverse Reaction (Verified 17 14:50)


 GI upset











Past Medical History





- Social History


Family history: Reviewed & Not Pertinent, Other - Her sisters all have 

dysmenorrhea





- Past Medical History


Cardiac Medical History: 


   Denies: Hx Pulmonary Embolism


Pulmonary Medical History: Reports: Hx Asthma - exercise induced


   Denies: Hx Sleep Apnea, Hx Tuberculosis


Renal/ Medical History: Reports: Hx Ovarian Cysts - PCOS.  Denies: Hx 

Peritoneal Dialysis


GI Medical History: Reports: Hx Gastroesophageal Reflux Disease, Hx Ulcer


Musculoskeltal Medical History: Reports Hx Musculoskeletal Deformity - Scoliosis

, Reports Hx Musculoskeletal Trauma - Sprains of knee and hip


Psychiatric Medical History: Reports: Hx Attention Deficit Hyperactivity 

Disorder, Hx Bipolar Disorder - age 10, Hx Personality Disorder, Hx Post 

Traumatic Stress Disorder, Hx Schizophrenia


   Denies: Hx Depression


Past Surgical History: Reports: Hx Breast Surgery, Hx Cardiac Surgery, Hx 

 Section - x2, Hx Tubal Ligation.  Denies: Hx Hysterectomy, Hx Pacemaker





- Immunizations


Immunizations up to date: Yes


Hx Diphtheria, Pertussis, Tetanus Vaccination: Yes - received in 





Physical Exam





- Vital signs


Vitals: 





 











Temp Pulse Resp BP Pulse Ox


 


 98.8 F   97   18   105/65   98 


 


 17 17:53  17 17:53  17 17:53  17 17:53  17 17:53














Course





- Vital Signs


Vital signs: 





 











Temp Pulse Resp BP Pulse Ox


 


 98.8 F   97   18   105/65   98 


 


 17 17:53  17 17:53  17 17:53  17 17:53  17 17:53

## 2017-02-13 ENCOUNTER — HOSPITAL ENCOUNTER (EMERGENCY)
Dept: HOSPITAL 62 - ER | Age: 32
Discharge: HOME | End: 2017-02-13
Payer: COMMERCIAL

## 2017-02-13 VITALS — SYSTOLIC BLOOD PRESSURE: 104 MMHG | DIASTOLIC BLOOD PRESSURE: 67 MMHG

## 2017-02-13 DIAGNOSIS — F17.200: ICD-10-CM
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
## 2017-02-13 DIAGNOSIS — R29.898: Primary | ICD-10-CM

## 2017-02-13 DIAGNOSIS — J45.909: ICD-10-CM

## 2017-02-13 PROCEDURE — 72040 X-RAY EXAM NECK SPINE 2-3 VW: CPT

## 2017-02-13 PROCEDURE — 99283 EMERGENCY DEPT VISIT LOW MDM: CPT

## 2017-02-13 NOTE — ER DOCUMENT REPORT
ED Medical Screen (RME)





- General


Stated Complaint: FALL BACK PAIN


Notes: 


accident was this past saturday 


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 

she was at the top and slid down due to lotion being on the pole. she states 

she landed on the back of her neck and left shoulder. able to move but with 

pain. has been doing ice and heat








taking APAP with minimal relief, on celebrex for endometriosis





PMH: endometriosis








I have greeted and performed a rapid initial assessment of this patient. A 

comprehensive ED assessment and evaluation of the patient, analysis of test 

results and completion of the medical decision making process will be conducted 

by additional ED providers.


TRAVEL OUTSIDE OF THE [place] IN LAST 30 DAYS: No





- Related Data


Allergies/Adverse Reactions: 


 





latex [Latex] Allergy (Severe, Verified 17 18:05)


 swelling,burning


trazodone [Trazodone] Allergy (Intermediate, Verified 17 18:05)


 Hives


ibuprofen Allergy (Verified 17 18:05)


 


tramadol [Tramadol] Allergy (Verified 17 18:05)


 


ondansetron HCl [From Zofran] Adverse Reaction (Intermediate, Verified 17 

18:05)


 GI upset


hydrocodone bitartrate [From Vicodin] Adverse Reaction (Verified 17 18:05)


 GI upset











Past Medical History





- Social History


Family history: Reviewed & Not Pertinent, Other - Her sisters all have 

dysmenorrhea





- Past Medical History


Cardiac Medical History: 


   Denies: Hx Pulmonary Embolism


Pulmonary Medical History: Reports: Hx Asthma - exercise induced


   Denies: Hx Sleep Apnea, Hx Tuberculosis


Renal/ Medical History: Reports: Hx Ovarian Cysts - PCOS.  Denies: Hx 

Peritoneal Dialysis


GI Medical History: Reports: Hx Gastroesophageal Reflux Disease, Hx Ulcer


Musculoskeltal Medical History: Reports Hx Musculoskeletal Deformity - Scoliosis

, Reports Hx Musculoskeletal Trauma - Sprains of knee and hip


Psychiatric Medical History: Reports: Hx Attention Deficit Hyperactivity 

Disorder, Hx Bipolar Disorder - age 10, Hx Personality Disorder, Hx Post 

Traumatic Stress Disorder, Hx Schizophrenia


   Denies: Hx Depression


Past Surgical History: Reports: Hx Breast Surgery, Hx Cardiac Surgery, Hx 

 Section - x2, Hx Tubal Ligation.  Denies: Hx Hysterectomy, Hx Pacemaker





- Immunizations


Immunizations up to date: Yes


Hx Diphtheria, Pertussis, Tetanus Vaccination: Yes - received in 





Physical Exam





- Vital signs


Vitals: 





 











Temp Pulse Resp BP Pulse Ox


 


 98.4 F   96   14   104/67   98 


 


 17 15:16  17 15:16  17 15:16  17 15:16  17 15:16














Course





- Vital Signs


Vital signs: 





 











Temp Pulse Resp BP Pulse Ox


 


 98.4 F   96   14   104/67   98 


 


 17 15:16  17 15:16  17 15:16  17 15:16  17 15:16

## 2017-02-13 NOTE — ER DOCUMENT REPORT
HPI





- HPI


Pain Level: 3


Context: 


Patient is a 31-year-old female presents emergency department after she fell 

while at work.  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 

was repeated with lotion and she slipped down and landed on her upper back and 

left shoulder.  She states that she's been able to walk is range of motion of 

her neck, denies any vision changes, headaches.  She's been doing heat and ice 

at home taking Tylenol but with minimal relief of her symptoms.





- REPRODUCTIVE


Reproductive: DENIES: Pregnant:





- DERM


Skin Color: Normal





Past Medical History





- General


Information source: Patient





- Social History


Smoking Status: Current Every Day Smoker


Chew tobacco use (# tins/day): No


Frequency of alcohol use: None


Drug Abuse: None


Family History: Arthritis, CAD, DM, Hyperlipidemia, Hypertension, Malignancy, 

Thyroid Disfunction


Patient has suicidal ideation: No


Patient has homicidal ideation: No





- Past Medical History


Cardiac Medical History: 


   Denies: Hx Pulmonary Embolism


Pulmonary Medical History: Reports: Hx Asthma - exercise induced


   Denies: Hx Sleep Apnea, Hx Tuberculosis


Renal/ Medical History: Reports: Hx Ovarian Cysts - PCOS.  Denies: Hx 

Peritoneal Dialysis


GI Medical History: Reports: Hx Gastroesophageal Reflux Disease, Hx Ulcer


Musculoskeltal Medical History: Reports Hx Musculoskeletal Deformity - Scoliosis

, Reports Hx Musculoskeletal Trauma - Sprains of knee and hip


Psychiatric Medical History: Reports: Hx Attention Deficit Hyperactivity 

Disorder, Hx Bipolar Disorder - age 10, Hx Personality Disorder, Hx Post 

Traumatic Stress Disorder, Hx Schizophrenia


   Denies: Hx Depression


Past Surgical History: Reports: Hx Breast Surgery, Hx Cardiac Surgery, Hx 

 Section - x2, Hx Tubal Ligation.  Denies: Hx Hysterectomy, Hx Pacemaker





- Immunizations


Immunizations up to date: Yes


Hx Diphtheria, Pertussis, Tetanus Vaccination: Yes - received in 


Hx Pneumococcal Vaccination: 12





Vertical Provider Document





- CONSTITUTIONAL


Agree With Documented VS: Yes


Exam Limitations: No Limitations


General Appearance: WD/WN, Mild Distress





- INFECTION CONTROL


TRAVEL OUTSIDE OF THE [place] IN LAST 30 DAYS: No





- HEENT


HEENT: Atraumatic, Normal ENT Exam, Normocephalic





- NECK


Neck: Normal Inspection, Other - no spinous process tenderness, stiff 

paraspinous muscles of c spine..  negative: Lymphadenopathy-Left, 

Lymphadenopathy-Right





- RESPIRATORY


O2 Sat by Pulse Oximetry: 98





- CARDIOVASCULAR


Pulses: Normal: Radial





- BACK


Back: Normal Inspection


Notes: 


no spinous process tenderness





- MUSCULOSKELETAL/EXTREMETIES


Notes: 


able to move left shoulder but with guarding, no painto palpaiton or PROM of 

the joint





- NEURO


Level of Consciousness: Awake, Alert, Appropriate


Motor/Sensory: No Motor Deficit, No Sensory Deficit





Course





- Re-evaluation


Re-evalutation: 





17 17:37


Patient is a 31-year-old female who suffered a mechanical fall.  No evidence of 

injury, acute fracture dislocation on x-ray.  Will discharge patient home and 

can follow up with PCP as needed.





- Vital Signs


Vital signs: 


 











Temp Pulse Resp BP Pulse Ox


 


 98.4 F   96   14   104/67   98 


 


 17 15:16  17 15:16  17 15:16  17 15:16  17 15:16














- Diagnostic Test


Radiology reviewed: Image reviewed, Reports reviewed





Discharge





- Discharge


Clinical Impression: 


Fall


Qualifiers:


 Encounter type: initial encounter Qualified Code(s): W19.XXXA - Unspecified 

fall, initial encounter





Condition: Good


Disposition: HOME, SELF-CARE


Instructions:  Muscle Strain ([facility]), Muscle Relaxers ([facility]), Warm Packs ([facility]), 

Ice Packs ([facility]), Exercise Program for the Shoulder ([facility])


Prescriptions: 


Cyclobenzaprine HCl [Flexeril 5 mg Tablet] 5 mg PO TID #15 tablet


Meloxicam [Mobic 15 mg Tablet] 15 mg PO DAILYP PRN #15 tablet


 PRN Reason: 


Forms:  Return to Work

## 2017-04-22 ENCOUNTER — HOSPITAL ENCOUNTER (EMERGENCY)
Dept: HOSPITAL 62 - ER | Age: 32
Discharge: LEFT BEFORE DISCHARGE | End: 2017-04-22
Payer: SELF-PAY

## 2017-04-22 VITALS — SYSTOLIC BLOOD PRESSURE: 112 MMHG | DIASTOLIC BLOOD PRESSURE: 71 MMHG

## 2017-04-22 DIAGNOSIS — R11.0: ICD-10-CM

## 2017-04-22 DIAGNOSIS — R10.2: ICD-10-CM

## 2017-04-22 DIAGNOSIS — Z53.9: Primary | ICD-10-CM

## 2017-04-22 DIAGNOSIS — Z79.899: ICD-10-CM

## 2017-04-22 DIAGNOSIS — R31.9: ICD-10-CM

## 2017-04-22 LAB
APPEARANCE UR: (no result)
BILIRUB UR QL STRIP: NEGATIVE
GLUCOSE UR STRIP-MCNC: NEGATIVE MG/DL
KETONES UR STRIP-MCNC: NEGATIVE MG/DL
NITRITE UR QL STRIP: POSITIVE
PH UR STRIP: 7 [PH] (ref 5–9)
PROT UR STRIP-MCNC: NEGATIVE MG/DL
SP GR UR STRIP: 1.01
UROBILINOGEN UR-MCNC: NEGATIVE MG/DL (ref ?–2)

## 2017-04-22 PROCEDURE — 99281 EMR DPT VST MAYX REQ PHY/QHP: CPT

## 2017-04-22 PROCEDURE — 81001 URINALYSIS AUTO W/SCOPE: CPT

## 2017-04-22 NOTE — ER DOCUMENT REPORT
ED Medical Screen (RME)





- General


Chief Complaint: Pelvic Pain


Stated Complaint: PELVIC PAIN


Notes: 


Patient is complaining of left lower quadrant pain that she's had for years.  

She says it's increased over the past couple of weeks.  Has seen her local 

physician who prescribed Percocet and Flexeril, but she is out of those 

medicines and pain has worsened.  She says that she has both bladder and 

ovarian cysts.  Has nausea.  Some blood in urine.  Think she may have had a 

fever but not sure.


LMP February.  Patient has had her tubes tied.  History of .  History 

of endometriosis.  History of ovarian cysts.





Patient has been noted to have many emergency department visits.  This visit 

marks the 18th visit of this patient to this emergency department in the past 

year, almost all of them for abdominal pains.


TRAVEL OUTSIDE OF THE [place] IN LAST 30 DAYS: No





- Related Data


Allergies/Adverse Reactions: 


 





latex [Latex] Allergy (Severe, Verified 17 10:42)


 swelling,burning


trazodone [Trazodone] Allergy (Intermediate, Verified 17 10:42)


 Hives


ibuprofen Allergy (Verified 17 10:42)


 


ketorolac [From Toradol] Allergy (Verified 17 10:42)


 


tramadol [Tramadol] Allergy (Verified 17 10:42)


 


ondansetron HCl [From Zofran] Adverse Reaction (Intermediate, Verified 17 

10:42)


 GI upset


hydrocodone bitartrate [From Vicodin] Adverse Reaction (Verified 17 10:42)


 GI upset











Past Medical History





- Social History


Family history: Reviewed & Not Pertinent, Other - Her sisters all have 

dysmenorrhea





- Past Medical History


Cardiac Medical History: 


   Denies: Hx Pulmonary Embolism


Pulmonary Medical History: Reports: Hx Asthma - exercise induced


   Denies: Hx Sleep Apnea, Hx Tuberculosis


Renal/ Medical History: Reports: Hx Ovarian Cysts - PCOS.  Denies: Hx 

Peritoneal Dialysis


GI Medical History: Reports: Hx Gastroesophageal Reflux Disease, Hx Ulcer


Musculoskeltal Medical History: Reports Hx Musculoskeletal Deformity - Scoliosis

, Reports Hx Musculoskeletal Trauma - Sprains of knee and hip


Psychiatric Medical History: Reports: Hx Attention Deficit Hyperactivity 

Disorder, Hx Bipolar Disorder - age 10, Hx Personality Disorder, Hx Post 

Traumatic Stress Disorder, Hx Schizophrenia


   Denies: Hx Depression


Past Surgical History: Reports: Hx Breast Surgery, Hx Cardiac Surgery, Hx 

 Section - x2, Hx Tubal Ligation.  Denies: Hx Hysterectomy, Hx Pacemaker





- Immunizations


Immunizations up to date: Yes


Hx Diphtheria, Pertussis, Tetanus Vaccination: Yes - received in 





Physical Exam





- Vital signs


Vitals: 


 











Temp Pulse Resp BP Pulse Ox


 


 98.8 F   92   18   112/71   98 


 


 17 10:43  17 10:43  17 10:43  17 10:43  17 10:43














Course





- Vital Signs


Vital signs: 


 











Temp Pulse Resp BP Pulse Ox


 


 98.8 F   92   18   112/71   98 


 


 17 10:43  17 10:43  17 10:43  17 10:43  17 10:43

## 2017-04-23 ENCOUNTER — HOSPITAL ENCOUNTER (EMERGENCY)
Dept: HOSPITAL 62 - ER | Age: 32
Discharge: LEFT BEFORE DISCHARGE | End: 2017-04-23
Payer: SELF-PAY

## 2017-04-23 VITALS — DIASTOLIC BLOOD PRESSURE: 71 MMHG | SYSTOLIC BLOOD PRESSURE: 119 MMHG

## 2017-04-23 DIAGNOSIS — R10.9: ICD-10-CM

## 2017-04-23 DIAGNOSIS — Z53.9: Primary | ICD-10-CM

## 2017-04-23 LAB
APPEARANCE UR: CLEAR
BARBITURATES UR QL SCN: NEGATIVE
BILIRUB UR QL STRIP: NEGATIVE
GLUCOSE UR STRIP-MCNC: NEGATIVE MG/DL
KETONES UR STRIP-MCNC: NEGATIVE MG/DL
METHADONE UR QL SCN: NEGATIVE
NITRITE UR QL STRIP: NEGATIVE
PCP UR QL SCN: NEGATIVE
PH UR STRIP: 6 [PH] (ref 5–9)
PROT UR STRIP-MCNC: NEGATIVE MG/DL
SP GR UR STRIP: 1.01
URINE OPIATES LOW: (no result)
UROBILINOGEN UR-MCNC: NEGATIVE MG/DL (ref ?–2)

## 2017-04-23 PROCEDURE — 81001 URINALYSIS AUTO W/SCOPE: CPT

## 2017-04-23 PROCEDURE — 99281 EMR DPT VST MAYX REQ PHY/QHP: CPT

## 2017-04-23 PROCEDURE — 81025 URINE PREGNANCY TEST: CPT

## 2017-04-23 PROCEDURE — 80307 DRUG TEST PRSMV CHEM ANLYZR: CPT

## 2017-04-23 NOTE — ER DOCUMENT REPORT
Doctor's Note


Notes: 





04/23/17 14:06


Nurse came to me and said that a urine that was ordered on the patient 

yesterday after Dr. Liriano did a medical screening exam showed a urinary tract 

infection she talked to Dr. Liriano who is here today and he was unwilling to 

write a prescription want her reevaluated the patient refused to be evaluated 

and so I had the nurse call her back tell her that the urine from yesterday was 

positive. Asked her to tell the patient that she needs to return to the 

emergency department for evaluation pelvic examination and further 

determination but the urinary tract needs to be treated with Macrobid twice a 

day. The nurse stated she would follow up on this.

## 2017-04-23 NOTE — ER DOCUMENT REPORT
Doctor's Note


Notes: 





04/23/17 08:51


Went to see the patient at the bedside nurse had assessed her. I was dealing 

with a patient of a ruptured brain aneurysm in transfer. Immediately upon 

entering the room with my scribe the patient began cussing and yelling telling 

me to have the nurse remove the IV. Nurse Was at the bedside as well and her d-

dimer nursing assessment. Patient's initial complaint was apparently pelvic 

pain. And pelvic exam was set up. Patient's vital signs were reviewed and and 

her medical history was reviewed as well. Patient with multiple visits to the 

emergency Department chronic pain related issues and concerns for substance 

abuse nonetheless plan was to do a full assessment and medical screening 

examination. Patient is awake alert with a GCS of 15 able to make her own 

decisions is refusing on numerous occasions to allow me to do even a medical 

screening examination. She is cussing and stating "I'd rather die than be 

examined by this hospital". patient states that she is going to go to another 

hospital to be assessed because she doesn't think it's appropriate that she 

would have to wait 4 hours to be seen.  She will not allow me to touch her take 

a history or do a physical exam at this point would be assaultive I did that. 

Encouraged her on numerous occasions to reconsider and allow me to do an 

examination on her. She continued to refuse verbally stating that she would not 

allow us to take any more history answer anymore questions or physically 

toucher at this point. She has an elopement.

## 2017-04-23 NOTE — ER DOCUMENT REPORT
ED General





- General


Chief Complaint: Abdominal Pain


Stated Complaint: ABDOMINAL PAIN,NAUSEA


TRAVEL OUTSIDE OF THE [place] IN LAST 30 DAYS: No





- Related Data


Allergies/Adverse Reactions: 


 





latex [Latex] Allergy (Severe, Verified 17 10:42)


 swelling,burning


trazodone [Trazodone] Allergy (Intermediate, Verified 17 10:42)


 Hives


ibuprofen Allergy (Verified 17 10:42)


 


ketorolac [From Toradol] Allergy (Verified 17 10:42)


 


tramadol [Tramadol] Allergy (Verified 17 10:42)


 


ondansetron HCl [From Zofran] Adverse Reaction (Intermediate, Verified 17 

10:42)


 GI upset


hydrocodone bitartrate [From Vicodin] Adverse Reaction (Verified 17 10:42)


 GI upset











Past Medical History





- Social History


Family History: Arthritis, CAD, DM, Hyperlipidemia, Hypertension, Malignancy, 

Thyroid Disfunction


Patient has suicidal ideation: No


Patient has homicidal ideation: No





- Past Medical History


Cardiac Medical History: 


   Denies: Hx Pulmonary Embolism


Pulmonary Medical History: Reports: Hx Asthma - exercise induced


   Denies: Hx Sleep Apnea, Hx Tuberculosis


Renal/ Medical History: Reports: Hx Ovarian Cysts - PCOS.  Denies: Hx 

Peritoneal Dialysis


GI Medical History: Reports: Hx Gastroesophageal Reflux Disease, Hx Ulcer


Musculoskeltal Medical History: Reports Hx Musculoskeletal Deformity - Scoliosis

, Reports Hx Musculoskeletal Trauma - Sprains of knee and hip


Psychiatric Medical History: Reports: Hx Attention Deficit Hyperactivity 

Disorder, Hx Bipolar Disorder - age 10, Hx Personality Disorder, Hx Post 

Traumatic Stress Disorder, Hx Schizophrenia


   Denies: Hx Depression


Past Surgical History: Reports: Hx Breast Surgery, Hx Cardiac Surgery, Hx 

 Section - x2, Hx Tubal Ligation.  Denies: Hx Hysterectomy, Hx Pacemaker





- Immunizations


Immunizations up to date: Yes


Hx Diphtheria, Pertussis, Tetanus Vaccination: Yes - received in 


Hx Pneumococcal Vaccination: 12





Physical Exam





- Vital signs


Vitals: 





 











Temp Pulse Resp BP Pulse Ox


 


 98.2 F   78   20   119/71   99 


 


 17 05:00  17 05:00  17 05:00  17 05:00  17 05:00














Course





- Vital Signs


Vital signs: 





 











Temp Pulse Resp BP Pulse Ox


 


 98.2 F   78   20   119/71   99 


 


 17 05:00  17 05:00  17 05:00  17 05:00  17 05:00

## 2017-05-04 ENCOUNTER — HOSPITAL ENCOUNTER (EMERGENCY)
Dept: HOSPITAL 62 - ER | Age: 32
Discharge: HOME | End: 2017-05-04
Payer: SELF-PAY

## 2017-05-04 VITALS — DIASTOLIC BLOOD PRESSURE: 72 MMHG | SYSTOLIC BLOOD PRESSURE: 102 MMHG

## 2017-05-04 DIAGNOSIS — R10.32: ICD-10-CM

## 2017-05-04 DIAGNOSIS — N94.6: Primary | ICD-10-CM

## 2017-05-04 DIAGNOSIS — F17.200: ICD-10-CM

## 2017-05-04 DIAGNOSIS — N93.9: ICD-10-CM

## 2017-05-04 LAB
ALBUMIN SERPL-MCNC: 4.2 G/DL (ref 3.5–5)
ALP SERPL-CCNC: 75 U/L (ref 38–126)
ALT SERPL-CCNC: 23 U/L (ref 9–52)
ANION GAP SERPL CALC-SCNC: 13 MMOL/L (ref 5–19)
APPEARANCE UR: CLEAR
AST SERPL-CCNC: 12 U/L (ref 14–36)
BASOPHILS # BLD AUTO: 0.1 10^3/UL (ref 0–0.2)
BASOPHILS NFR BLD AUTO: 1 % (ref 0–2)
BILIRUB DIRECT SERPL-MCNC: 0.4 MG/DL (ref 0–0.4)
BILIRUB SERPL-MCNC: 0.6 MG/DL (ref 0.2–1.3)
BILIRUB UR QL STRIP: NEGATIVE
BUN SERPL-MCNC: 9 MG/DL (ref 7–20)
CALCIUM: 9.5 MG/DL (ref 8.4–10.2)
CHLAM PCR: NOT DETECTED
CHLORIDE SERPL-SCNC: 105 MMOL/L (ref 98–107)
CO2 SERPL-SCNC: 26 MMOL/L (ref 22–30)
CREAT SERPL-MCNC: 0.73 MG/DL (ref 0.52–1.25)
EOSINOPHIL # BLD AUTO: 0.2 10^3/UL (ref 0–0.6)
EOSINOPHIL NFR BLD AUTO: 2.2 % (ref 0–6)
ERYTHROCYTE [DISTWIDTH] IN BLOOD BY AUTOMATED COUNT: 15.8 % (ref 11.5–14)
GLUCOSE SERPL-MCNC: 92 MG/DL (ref 75–110)
GLUCOSE UR STRIP-MCNC: NEGATIVE MG/DL
HCT VFR BLD CALC: 41.3 % (ref 36–47)
HGB BLD-MCNC: 13.6 G/DL (ref 12–15.5)
HGB HCT DIFFERENCE: -0.5
KETONES UR STRIP-MCNC: (no result) MG/DL
LYMPHOCYTES # BLD AUTO: 2.2 10^3/UL (ref 0.5–4.7)
LYMPHOCYTES NFR BLD AUTO: 29.3 % (ref 13–45)
MCH RBC QN AUTO: 30.3 PG (ref 27–33.4)
MCHC RBC AUTO-ENTMCNC: 33 G/DL (ref 32–36)
MCV RBC AUTO: 92 FL (ref 80–97)
MONOCYTES # BLD AUTO: 0.4 10^3/UL (ref 0.1–1.4)
MONOCYTES NFR BLD AUTO: 5 % (ref 3–13)
NEUTROPHILS # BLD AUTO: 4.8 10^3/UL (ref 1.7–8.2)
NEUTS SEG NFR BLD AUTO: 62.5 % (ref 42–78)
NITRITE UR QL STRIP: NEGATIVE
PH UR STRIP: 5 [PH] (ref 5–9)
POTASSIUM SERPL-SCNC: 4.5 MMOL/L (ref 3.6–5)
PROT SERPL-MCNC: 7.1 G/DL (ref 6.3–8.2)
PROT UR STRIP-MCNC: NEGATIVE MG/DL
RBC # BLD AUTO: 4.5 10^6/UL (ref 3.72–5.28)
SODIUM SERPL-SCNC: 144.4 MMOL/L (ref 137–145)
SP GR UR STRIP: 1.01
UROBILINOGEN UR-MCNC: NEGATIVE MG/DL (ref ?–2)
WBC # BLD AUTO: 7.6 10^3/UL (ref 4–10.5)

## 2017-05-04 PROCEDURE — 85025 COMPLETE CBC W/AUTO DIFF WBC: CPT

## 2017-05-04 PROCEDURE — 80053 COMPREHEN METABOLIC PANEL: CPT

## 2017-05-04 PROCEDURE — 99284 EMERGENCY DEPT VISIT MOD MDM: CPT

## 2017-05-04 PROCEDURE — 76830 TRANSVAGINAL US NON-OB: CPT

## 2017-05-04 PROCEDURE — 51701 INSERT BLADDER CATHETER: CPT

## 2017-05-04 PROCEDURE — 93976 VASCULAR STUDY: CPT

## 2017-05-04 PROCEDURE — 87491 CHLMYD TRACH DNA AMP PROBE: CPT

## 2017-05-04 PROCEDURE — 87591 N.GONORRHOEAE DNA AMP PROB: CPT

## 2017-05-04 PROCEDURE — 36415 COLL VENOUS BLD VENIPUNCTURE: CPT

## 2017-05-04 PROCEDURE — 87210 SMEAR WET MOUNT SALINE/INK: CPT

## 2017-05-04 PROCEDURE — 81001 URINALYSIS AUTO W/SCOPE: CPT

## 2017-05-04 PROCEDURE — 84703 CHORIONIC GONADOTROPIN ASSAY: CPT

## 2017-05-04 NOTE — ER DOCUMENT REPORT
ED GI/





- General


Chief Complaint: Vaginal Bleeding


Stated Complaint: ABNORMAL MENSTRAL WITH PAIN


Mode of Arrival: Ambulatory


Information source: Patient


Notes: 


Patient presents complaining of four-day history of pelvic pain to the left 

lower abdomen and vaginal bleeding.  Patient states that she was seen 3 weeks 

ago in [place] and diagnosed with an ovarian cyst.  Patient states she then 

followed up a week later and just had lab work and was sent home.  Patient 

states 3 days ago her pain started to get worse and she had heavy vaginal 

bleeding.  Patient states that her vaginal bleeding has currently improved.  

Patient states that she has been told that she likely has endometriosis and her 

GYN doctor is planning to set her up for a hysterectomy.


TRAVEL OUTSIDE OF THE [place] IN LAST 30 DAYS: No





- HPI


Patient complains to provider of: Pelvic pain, Vaginal bleeding.  No: Vaginal 

discharge


Onset: Other


Timing/Duration: Worse - 3 days


Quality of pain: Sharp


Pain Level: 5


Location: LLQ


Vaginal bleeding (Compared to normal period): Heavier


Sexual history: Active


Associated symptoms: denies: Dysuria, Fever, Hematuria, Urinary hesitancy, 

Urinary frequency, Urinary retention, Urinary urgency, Vaginal discharge, 

Vomiting





- Related Data


Allergies/Adverse Reactions: 


 





latex [Latex] Allergy (Severe, Verified 17 14:07)


 swelling,burning


trazodone [Trazodone] Allergy (Intermediate, Verified 17 14:07)


 Hives


ibuprofen Allergy (Verified 17 14:07)


 


ketorolac [From Toradol] Allergy (Verified 17 14:07)


 


tramadol [Tramadol] Allergy (Verified 17 14:07)


 


ondansetron HCl [From Zofran] Adverse Reaction (Intermediate, Verified 17 

14:07)


 GI upset


hydrocodone bitartrate [From Vicodin] Adverse Reaction (Verified 17 14:07)


 GI upset











Past Medical History





- General


Information source: Patient


Last Menstrual Period: 2017





- Social History


Smoking Status: Current Every Day Smoker


Frequency of alcohol use: None


Drug Abuse: None


Occupation: Lawn care


Family History: Arthritis, CAD, DM, Hyperlipidemia, Hypertension, Malignancy, 

Thyroid Disfunction


Patient has suicidal ideation: No


Patient has homicidal ideation: No





- Past Medical History


Cardiac Medical History: 


   Denies: Hx Pulmonary Embolism


Pulmonary Medical History: Reports: Hx Asthma - exercise induced


   Denies: Hx Sleep Apnea, Hx Tuberculosis


Renal/ Medical History: Reports: Hx Ovarian Cysts - PCOS.  Denies: Hx 

Peritoneal Dialysis


GI Medical History: Reports: Hx Gastroesophageal Reflux Disease, Hx Ulcer


Musculoskeltal Medical History: Reports Hx Musculoskeletal Deformity - Scoliosis

, Reports Hx Musculoskeletal Trauma - Sprains of knee and hip


Psychiatric Medical History: Reports: Hx Attention Deficit Hyperactivity 

Disorder, Hx Bipolar Disorder - age 10, Hx Personality Disorder, Hx Post 

Traumatic Stress Disorder, Hx Schizophrenia


   Denies: Hx Depression


Past Surgical History: Reports: Hx Breast Surgery, Hx Cardiac Surgery, Hx 

 Section - x2, Hx Tubal Ligation.  Denies: Hx Hysterectomy, Hx Pacemaker





- Immunizations


Immunizations up to date: Yes


Hx Diphtheria, Pertussis, Tetanus Vaccination: Yes - received in 


Hx Pneumococcal Vaccination: 12





Review of Systems





- Review of Systems


Constitutional: No symptoms reported.  denies: Fever


EENT: No symptoms reported


Cardiovascular: No symptoms reported.  denies: Chest pain


Respiratory: No symptoms reported.  denies: Cough, Short of breath


Gastrointestinal: Abdominal pain.  denies: Nausea, Vomiting


Genitourinary: No symptoms reported.  denies: Dysuria, Flank pain


Female Genitourinary: Irregular period, Vaginal bleeding.  denies: Vaginal 

discharge


Musculoskeletal: No symptoms reported.  denies: Back pain


Skin: No symptoms reported


Hematologic/Lymphatic: No symptoms reported


Neurological/Psychological: No symptoms reported





Physical Exam





- Vital signs


Vitals: 


 











Temp Pulse Resp BP Pulse Ox


 


 98.6 F   83   16   121/96 H  99 


 


 17 13:35  17 13:35  17 13:35  17 13:35  17 13:35














- General


General appearance: Appears well, Alert


In distress: None





- HEENT


Head: Normocephalic, Atraumatic


Eyes: Normal


Nasal: Normal


Mouth/Lips: Normal


Mucous membranes: Normal


Neck: Normal, Supple.  No: Lymphadenopathy





- Respiratory


Respiratory status: No respiratory distress


Chest status: Nontender


Breath sounds: Normal.  No: Rales, Rhonchi, Stridor, Wheezing


Chest palpation: Normal





- Cardiovascular


Rhythm: Regular


Heart sounds: S1 appreciated, S2 appreciated


Murmur: No





- Abdominal


Inspection: Normal


Distension: No distension


Bowel sounds: Normal


Tenderness: Tender - Left lower pelvic tenderness


Organomegaly: No organomegaly





- Genitourinary


External exam: Normal


Speculum exam: Cervix closed


Vaginal bleeding: Mild


Bimanuel exam: Adnexal tenderness - left.  No: Cervical motion tender





- Back


Back: Normal, Nontender.  No: CVA tenderness





- Extremities


General upper extremity: Normal inspection, Normal strength


General lower extremity: Normal inspection, Normal strength





- Neurological


Neuro grossly intact: Yes


Cognition: Normal


[place] Coma Scale Eye Opening: Spontaneous


[place] Coma Scale Verbal: Oriented


[place] Coma Scale Motor: Obeys Commands


[place] Coma Scale Total: 15





- Psychological


Associated symptoms: Normal affect, Normal mood





- Skin


Skin Temperature: Warm


Skin Moisture: Dry


Skin Color: Normal





Course





- Re-evaluation


Re-evalutation: 


17 15:06


Review of controlled substance database shows that patient's had multiple 

prescriptions patient received a prescription for 12 oxycodone 5 mg tablets on 

2017 written by provider in [place], patient then had an additional 

prescription of 12 oxycodone 5 mg tablets filled on 2017 in [facility], patient additionally had 30 oxycodone 5 mg tablets filled on  by Dr. Richter out of [facility].  Patient states that 

she was only prescribed one of these and that somebody has been filling 

prescriptions and her name that she has not received.  Patient encouraged to 

follow up with the Police Department and make a report.





17 


Discussed results of patient's diagnostic test results with her.  Patient 

encouraged to follow-up with her gynecologist for further evaluation.  Patient 

advised that she will need to get pain medication from her gynecologist and can 

take Tylenol over-the-counter otherwise.  Patient with history of frequent 

visits in the emergency department for chronic painful conditions.  Patient was 

hand delivered a lining or outlining our narcotic policy.








- Vital Signs


Vital signs: 


 











Temp Pulse Resp BP Pulse Ox


 


 97.9 F   76   16   102/72   100 


 


 17 18:37  17 18:37  17 13:37  17 18:37  17 18:37














- Laboratory


Result Diagrams: 


 17 14:25





 17 14:25


Laboratory results interpreted by me: 


 











  17





  14:25 14:25 16:21


 


RDW  15.8 H  


 


AST   12 L 


 


Urine Ketones    TRACE H








17 18:29





 Labs- Entire Visit











  17





  14:25 14:25 14:25


 


WBC  7.6  


 


RBC  4.50  


 


Hgb  13.6  


 


Hct  41.3  


 


MCV  92  


 


MCH  30.3  


 


MCHC  33.0  


 


RDW  15.8 H  


 


Plt Count  333  


 


Seg Neutrophils %  62.5  


 


Lymphocytes %  29.3  


 


Monocytes %  5.0  


 


Eosinophils %  2.2  


 


Basophils %  1.0  


 


Absolute Neutrophils  4.8  


 


Absolute Lymphocytes  2.2  


 


Absolute Monocytes  0.4  


 


Absolute Eosinophils  0.2  


 


Absolute Basophils  0.1  


 


Sodium   144.4 


 


Potassium   4.5 


 


Chloride   105 


 


Carbon Dioxide   26 


 


Anion Gap   13 


 


BUN   9 


 


Creatinine   0.73 


 


Est GFR ( Amer)   > 60 


 


Est GFR (Non-Af Amer)   > 60 


 


Glucose   92 


 


Calcium   9.5 


 


Total Bilirubin   0.6 


 


Direct Bilirubin   0.4 


 


Indirect Bilirubin   Not Reportable 


 


Neonat Total Bilirubin   Not Reportable 


 


AST   12 L 


 


ALT   23 


 


Alkaline Phosphatase   75 


 


Total Protein   7.1 


 


Albumin   4.2 


 


Serum HCG, Qual    NEGATIVE


 


Urine Color   


 


Urine Appearance   


 


Urine pH   


 


Ur Specific Gravity   


 


Urine Protein   


 


Urine Glucose (UA)   


 


Urine Ketones   


 


Urine Blood   


 


Urine Nitrite   


 


Urine Bilirubin   


 


Urine Urobilinogen   


 


Ur Leukocyte Esterase   


 


Urine RBC (Auto)   


 


Squamous Epi Cells Auto   


 


Urine Mucus (Auto)   


 


Urine Ascorbic Acid   


 


Trichomonas (Wet Prep)   


 


Vaginal WBC   


 


Vaginal RBC   


 


Vaginal Yeast   














  17





  16:21 16:21


 


WBC  


 


RBC  


 


Hgb  


 


Hct  


 


MCV  


 


MCH  


 


MCHC  


 


RDW  


 


Plt Count  


 


Seg Neutrophils %  


 


Lymphocytes %  


 


Monocytes %  


 


Eosinophils %  


 


Basophils %  


 


Absolute Neutrophils  


 


Absolute Lymphocytes  


 


Absolute Monocytes  


 


Absolute Eosinophils  


 


Absolute Basophils  


 


Sodium  


 


Potassium  


 


Chloride  


 


Carbon Dioxide  


 


Anion Gap  


 


BUN  


 


Creatinine  


 


Est GFR ( Amer)  


 


Est GFR (Non-Af Amer)  


 


Glucose  


 


Calcium  


 


Total Bilirubin  


 


Direct Bilirubin  


 


Indirect Bilirubin  


 


Neonat Total Bilirubin  


 


AST  


 


ALT  


 


Alkaline Phosphatase  


 


Total Protein  


 


Albumin  


 


Serum HCG, Qual  


 


Urine Color  YELLOW 


 


Urine Appearance  CLEAR 


 


Urine pH  5.0 


 


Ur Specific Gravity  1.015 


 


Urine Protein  NEGATIVE 


 


Urine Glucose (UA)  NEGATIVE 


 


Urine Ketones  TRACE H 


 


Urine Blood  NEGATIVE 


 


Urine Nitrite  NEGATIVE 


 


Urine Bilirubin  NEGATIVE 


 


Urine Urobilinogen  NEGATIVE 


 


Ur Leukocyte Esterase  NEGATIVE 


 


Urine RBC (Auto)  0 


 


Squamous Epi Cells Auto  2 


 


Urine Mucus (Auto)  RARE 


 


Urine Ascorbic Acid  NEGATIVE 


 


Trichomonas (Wet Prep)   NO TRICHOMONAS SEEN


 


Vaginal WBC   RARE WBCS SEEN


 


Vaginal RBC   2+ RBCS SEEN


 


Vaginal Yeast   NO YEAST SEEN














17 19:18








- Diagnostic Test


Radiology reviewed: Reports reviewed





Discharge





- Discharge


Clinical Impression: 


 Dysmenorrhea





Condition: Stable


Disposition: HOME, SELF-CARE


Instructions:  Dysmenorrhea ([facility]), Acetaminophen


Additional Instructions: 


Return immediately for any new or worsening symptoms





Followup with your primary care provider, call tomorrow to make a followup 

appointment





Follow-up with your GYN doctor for recheck.


Forms:  Return to Work


Referrals: 


JOVANY RICHTER MD [Primary Care Provider] - Follow up tomorrow

## 2017-05-04 NOTE — ER DOCUMENT REPORT
ED Medical Screen (RME)





- General


Chief Complaint: Vaginal Bleeding


Stated Complaint: ABNORMAL MENSTRAL WITH PAIN


TRAVEL OUTSIDE OF THE [place] IN LAST 30 DAYS: No





- HPI


Onset: Other - 5 DAYS


Onset/Duration: Gradual


Quality of pain: Cramping


Severity: Moderate


Associated Symptoms: Vaginal bleeding


Exacerbated by: Denies


Relieved by: Denies


Recently seen / treated by doctor: Yes - 4 d AGO, E.D. IN [place]





- Related Data


Allergies/Adverse Reactions: 


 





latex [Latex] Allergy (Severe, Verified 17 14:07)


 swelling,burning


trazodone [Trazodone] Allergy (Intermediate, Verified 17 14:07)


 Hives


ibuprofen Allergy (Verified 17 14:07)


 


ketorolac [From Toradol] Allergy (Verified 17 14:07)


 


tramadol [Tramadol] Allergy (Verified 17 14:07)


 


ondansetron HCl [From Zofran] Adverse Reaction (Intermediate, Verified 17 

14:07)


 GI upset


hydrocodone bitartrate [From Vicodin] Adverse Reaction (Verified 17 14:07)


 GI upset











Past Medical History





- General


Information source: Patient


Last Menstrual Period: 2017





- Social History


Family history: Reviewed & Not Pertinent, Other - Her sisters all have 

dysmenorrhea





- Past Medical History


Cardiac Medical History: 


   Denies: Hx Pulmonary Embolism


Pulmonary Medical History: Reports: Hx Asthma - exercise induced


   Denies: Hx Sleep Apnea, Hx Tuberculosis


Renal/ Medical History: Reports: Hx Ovarian Cysts - PCOS.  Denies: Hx 

Peritoneal Dialysis


GI Medical History: Reports: Hx Gastroesophageal Reflux Disease, Hx Ulcer


Musculoskeltal Medical History: Reports Hx Musculoskeletal Deformity - Scoliosis

, Reports Hx Musculoskeletal Trauma - Sprains of knee and hip


Psychiatric Medical History: Reports: Hx Attention Deficit Hyperactivity 

Disorder, Hx Bipolar Disorder - age 10, Hx Personality Disorder, Hx Post 

Traumatic Stress Disorder, Hx Schizophrenia


   Denies: Hx Depression


Past Surgical History: Reports: Hx Breast Surgery, Hx Cardiac Surgery, Hx 

 Section - x2, Hx Tubal Ligation.  Denies: Hx Hysterectomy, Hx Pacemaker





- Immunizations


Immunizations up to date: Yes


Hx Diphtheria, Pertussis, Tetanus Vaccination: Yes - received in 





Review of Systems





- Review of Systems


Constitutional: Weakness.  denies: Chills, Fever


EENT: No symptoms reported


Cardiovascular: No symptoms reported


Respiratory: No symptoms reported


Female Genitourinary: See HPI


Musculoskeletal: No symptoms reported


Skin: No symptoms reported


Neurological/Psychological: No symptoms reported





Physical Exam





- Vital signs


Vitals: 





 











Temp Pulse Resp BP Pulse Ox


 


 98.6 F   83   16   121/96 H  99 


 


 17 13:35  17 13:35  17 13:35  17 13:35  17 13:35











Interpretation: Hypertensive.  No: Tachycardic, Tachypneic, Febrile





- General


General appearance: Alert


In distress: Mild





- HEENT


Head: Normocephalic


Eyes: Normal


Ears: Normal


Nasal: Normal


Mouth/Lips: Normal


Mucous membranes: Normal





- Respiratory


Respiratory status: No respiratory distress





Course





- Vital Signs


Vital signs: 





 











Temp Pulse Resp BP Pulse Ox


 


 98.6 F   83   16   121/96 H  99 


 


 17 13:35  17 13:35  17 13:35  17 13:35  17 13:35

## 2017-09-20 ENCOUNTER — HOSPITAL ENCOUNTER (EMERGENCY)
Dept: HOSPITAL 62 - ER | Age: 32
Discharge: HOME | End: 2017-09-20
Payer: SELF-PAY

## 2017-09-20 VITALS — SYSTOLIC BLOOD PRESSURE: 105 MMHG | DIASTOLIC BLOOD PRESSURE: 72 MMHG

## 2017-09-20 DIAGNOSIS — M54.40: Primary | ICD-10-CM

## 2017-09-20 DIAGNOSIS — M54.9: ICD-10-CM

## 2017-09-20 DIAGNOSIS — M79.605: ICD-10-CM

## 2017-09-20 DIAGNOSIS — M79.604: ICD-10-CM

## 2017-09-20 PROCEDURE — 99283 EMERGENCY DEPT VISIT LOW MDM: CPT

## 2017-09-20 NOTE — ER DOCUMENT REPORT
ED General





- General


Chief Complaint: Back Pain


Stated Complaint: BACK PAIN


Time Seen by Provider: 17 22:21


Notes: 





Patient is a 32-year-old female who presents with 3 weeks of low back pain as 

well as bilateral lower extremity pain.  States this started after she was 

thrown off of a horse landing onto her back 3 weeks ago.  States that she was 

improving but then reinjured her back when she attempted to lift a saddle up 

onto a horse.  States that since that time she has had a severe, constant, 

throbbing pain to her bilateral low and mid back with a shooting, burning pain 

radiating into her bilateral lower extremities.  She denies any focal weakness, 

numbness, although states sometimes her legs "just give out on me".  She has 

not had any bowel or bladder incontinence.  No IV drug use or fever.  She has 

been trying over-the-counter medications with minimal improvement of her pain.  

Any movement or bending over worsens her pain.  She has a history of prior back 

injuries including a sciatic nerve irritation.  She has not seen her primary 

care doctor regarding today's concerns.


TRAVEL OUTSIDE OF THE [place] IN LAST 30 DAYS: No





- Related Data


Allergies/Adverse Reactions: 


 





latex [Latex] Allergy (Severe, Verified 17 14:07)


 swelling,burning


trazodone [Trazodone] Allergy (Intermediate, Verified 17 14:07)


 Hives


ibuprofen Allergy (Verified 17 14:07)


 


ketorolac [From Toradol] Allergy (Verified 17 14:07)


 


tramadol [Tramadol] Allergy (Verified 17 14:07)


 


ondansetron HCl [From Zofran] Adverse Reaction (Intermediate, Verified 17 

14:07)


 GI upset


hydrocodone bitartrate [From Vicodin] Adverse Reaction (Verified 17 14:07)


 GI upset











Past Medical History





- General


Information source: Patient





- Social History


Smoking Status: Never Smoker


Frequency of alcohol use: None


Drug Abuse: None


Lives with: Spouse/Significant other


Family History: Arthritis, CAD, DM, Hyperlipidemia, Hypertension, Malignancy, 

Thyroid Disfunction


Patient has suicidal ideation: No


Patient has homicidal ideation: No





- Past Medical History


Cardiac Medical History: 


   Denies: Hx Pulmonary Embolism


Pulmonary Medical History: Reports: Hx Asthma - exercise induced


   Denies: Hx Sleep Apnea, Hx Tuberculosis


Renal/ Medical History: Reports: Hx Ovarian Cysts - PCOS.  Denies: Hx 

Peritoneal Dialysis


GI Medical History: Reports: Hx Gastroesophageal Reflux Disease, Hx Ulcer


Musculoskeltal Medical History: Reports Hx Musculoskeletal Deformity - Scoliosis

, Reports Hx Musculoskeletal Trauma - Sprains of knee and hip


Psychiatric Medical History: Reports: Hx Attention Deficit Hyperactivity 

Disorder, Hx Bipolar Disorder - age 10, Hx Personality Disorder, Hx Post 

Traumatic Stress Disorder, Hx Schizophrenia


   Denies: Hx Depression


Past Surgical History: Reports: Hx Breast Surgery, Hx Cardiac Surgery, Hx 

 Section - x2, Hx Tubal Ligation.  Denies: Hx Hysterectomy, Hx Pacemaker





- Immunizations


Immunizations up to date: Yes


Hx Diphtheria, Pertussis, Tetanus Vaccination: Yes - received in 


Hx Pneumococcal Vaccination: 12





Review of Systems





- Review of Systems


Notes: 





Constitutional: Negative for fever.


HENT: Negative for sore throat.


Eyes: Negative for visual changes.


Cardiovascular: Negative for chest pain.


Respiratory: Negative for shortness of breath.


Gastrointestinal: Negative for abdominal pain, vomiting or diarrhea.


Genitourinary: Negative for dysuria.


Musculoskeletal:  positive for back pain.


Skin: Negative for rash.


Neurological: Negative for headaches, weakness or numbness.





10 point ROS negative except as marked above and in HPI.





Physical Exam





- Vital signs


Vitals: 


 











Temp Pulse Resp BP Pulse Ox


 


 99.0 F   113 H  20   105/72   100 


 


 17 20:33  17 20:33  17 20:33  17 20:33  17 20:33











Interpretation: Tachycardic


Notes: 





PHYSICAL EXAMINATION:





GENERAL: Well-appearing, well-nourished and in no acute distress.





HEAD: Atraumatic, normocephalic.





EYES: Pupils equal round and reactive to light, extraocular movements intact, 

sclera anicteric, conjunctiva are normal.





ENT: nares patent, oropharynx clear without exudates.  Moist mucous membranes.





NECK: Normal range of motion, supple without lymphadenopathy





LUNGS: Breath sounds clear to auscultation bilaterally and equal.  No wheezes 

rales or rhonchi.





HEART: Regular rate and rhythm without murmurs





ABDOMEN: Soft, nontender, normoactive bowel sounds.  No guarding, no rebound.  

No masses appreciated.





EXTREMITIES: Normal range of motion, no pitting or edema.  No cyanosis.





Back: No midline spinal tenderness, step-offs or deformities





NEUROLOGICAL: 5 out of 5 strength both distally and proximally bilateral lower 

extremities.  2+ patellar reflexes bilaterally.  No clonus.  Sensation grossly 

intact in the bilateral lower extremities.  Patient is able to ambulate without 

difficulty.





PSYCH: Normal mood, normal affect.





SKIN: Warm, Dry, normal turgor, no rashes or lesions noted.





Course





- Re-evaluation


Re-evalutation: 





17 22:45


Presentation of a well appearing patient complaining of acute on chronic back 

pain. No rapid progression of symptoms, systemic symptoms including fevers, 

chills, weight loss, history of recent bacterial infection, bilateral symptoms, 

numbness, weakness, difficulty walking, urinary retention or bowel incontinence

, personal history of cancer, immunosuppression, diabetes, known AAA, or 

history of IV drug use.  Exam is without point tenderness over vertebral bodies

, pulsatile abdominal mass, and patient has symmetric and intact lower 

extremity strength, sensation, and reflexes without clonus. 2+ symmetric medial 

malleolar and dorsalis pedis pulses








Based on history and physical, I have a very low suspicion of a concerning 

etiology of pain including epidural compression syndrome, spinal infection, 

transverse myelitis, malignancy, abdominal aortic aneurysm, renal colic, acute 

lower extremity claudication, neurogenic claudication, ankylosing spondylitis, 

or other intra-abdominal process. Due to absence of concerning risk factors in 

history and physical as well as absence of rapidly progressive, severe, or 

bilateral symptoms, will defer imaging at this point.  Patient has a history of 

recurrent visits to the emergency department for chronic pain issues.  Due to 

her NSAID allergy, will start on a short course of prednisone and Lidoderm 

patches.  At this time will discharge with return precautions and follow-up 

recommendations.  Verbal discharge instructions given a the bedside and 

opportunity for questions given. Medication warnings reviewed. Patient is in 

agreement with this plan and has verbalized understanding of return precautions 

and the need for primary care follow-up in the next 24-72 hours.





- Vital Signs


Vital signs: 


 











Temp Pulse Resp BP Pulse Ox


 


 99.0 F   113 H  20   105/72   100 


 


 17 20:33  17 20:33  17 20:33  17 20:33  17 20:33














Discharge





- Discharge


Clinical Impression: 


Low back pain


Qualifiers:


 Chronicity: acute Back pain laterality: unspecified Sciatica presence: with 

sciatica Sciatica laterality: sciatica laterality unspecified Qualified Code(s)

: M54.40 - Lumbago with sciatica, unspecified side





Condition: Good


Disposition: HOME, SELF-CARE


Additional Instructions: 


You have been seen in the Emergency Department (ED)  today for back pain.  Your 

workup and exam have not shown any acute abnormalities and you are likely 

suffering from muscle strain or possible problems with your discs, but there is 

no treatment that will fix your symptoms at this time.  Please take the 

steroids as prescribed.  You should also purchase a local lidocaine cream such 

as "aspercreme with lidocaine" and use per bottle instructions to the affected 

area. Apply heat to the area as often as you are able. Continue to keep active 

and avoid prolonged periods of bed rest.





Please follow up with your doctor as soon as possible regarding today's ED 

visit and your back pain.  Return to the ED for worsening back pain, fever, 

weakness or numbness of either leg, or if you develop either (1) an inability 

to urinate or have bowel movements, or (2) loss of your ability to control your 

bathroom functions (if you start having "accidents"), or if you develop other 

new symptoms that concern you.concern you.


Prescriptions: 


Prednisone [Deltasone 20 mg Tablet] 2 tab PO DAILY 5 Days  tablet

## 2017-09-21 ENCOUNTER — HOSPITAL ENCOUNTER (EMERGENCY)
Dept: HOSPITAL 62 - ER | Age: 32
Discharge: HOME | End: 2017-09-21
Payer: SELF-PAY

## 2017-09-21 VITALS — DIASTOLIC BLOOD PRESSURE: 67 MMHG | SYSTOLIC BLOOD PRESSURE: 100 MMHG

## 2017-09-21 DIAGNOSIS — N39.0: ICD-10-CM

## 2017-09-21 DIAGNOSIS — Z88.6: ICD-10-CM

## 2017-09-21 DIAGNOSIS — R11.10: ICD-10-CM

## 2017-09-21 DIAGNOSIS — F17.200: ICD-10-CM

## 2017-09-21 DIAGNOSIS — Z88.5: ICD-10-CM

## 2017-09-21 DIAGNOSIS — M54.5: ICD-10-CM

## 2017-09-21 DIAGNOSIS — Z88.8: ICD-10-CM

## 2017-09-21 DIAGNOSIS — Z91.040: ICD-10-CM

## 2017-09-21 DIAGNOSIS — M54.6: Primary | ICD-10-CM

## 2017-09-21 DIAGNOSIS — J45.909: ICD-10-CM

## 2017-09-21 DIAGNOSIS — R32: ICD-10-CM

## 2017-09-21 LAB
APPEARANCE UR: (no result)
BILIRUB UR QL STRIP: NEGATIVE
GLUCOSE UR STRIP-MCNC: NEGATIVE MG/DL
KETONES UR STRIP-MCNC: NEGATIVE MG/DL
NITRITE UR QL STRIP: POSITIVE
PH UR STRIP: 6 [PH] (ref 5–9)
PROT UR STRIP-MCNC: NEGATIVE MG/DL
SP GR UR STRIP: 1.02
UROBILINOGEN UR-MCNC: NEGATIVE MG/DL (ref ?–2)

## 2017-09-21 PROCEDURE — 81001 URINALYSIS AUTO W/SCOPE: CPT

## 2017-09-21 PROCEDURE — 99283 EMERGENCY DEPT VISIT LOW MDM: CPT

## 2017-09-21 PROCEDURE — 96372 THER/PROPH/DIAG INJ SC/IM: CPT

## 2017-09-21 PROCEDURE — 81025 URINE PREGNANCY TEST: CPT

## 2017-09-21 NOTE — ER DOCUMENT REPORT
ED General





- General


Chief Complaint: Back Pain


Stated Complaint: BACK PAIN


Time Seen by Provider: 17 21:34


Notes: 


Patient is a 32 year old female that comes to the ED for chief complaint of 

pain in her mid and lower back. She also states her legs are numb and when she 

got up this morning she accidentally urinated on herself. She states she has 

not urinated all day since that time. She denies fecal incontinence. She was 

seen here yesterday, was placed on prednisone, she states she threw it up after 

taking it today. She states she was thrown from a horse 3 weeks ago, about 1.5 

weeks ago she lifted a saddle and "re-pulled" her back. She denies re-injury, 

fever, IV drug abuse. She denies any daily medications, denies any surgeries. 





TRAVEL OUTSIDE OF THE [place] IN LAST 30 DAYS: No





- Related Data


Allergies/Adverse Reactions: 


 





latex [Latex] Allergy (Severe, Verified 17 20:21)


 swelling,burning


trazodone [Trazodone] Allergy (Intermediate, Verified 17 20:21)


 Hives


ibuprofen Allergy (Verified 17 20:21)


 


ketorolac [From Toradol] Allergy (Verified 17 20:21)


 


tramadol [Tramadol] Allergy (Verified 17 20:21)


 


ondansetron HCl [From Zofran] Adverse Reaction (Intermediate, Verified 17 

20:21)


 GI upset


hydrocodone bitartrate [From Vicodin] Adverse Reaction (Verified 17 20:21)


 GI upset











Past Medical History





- General


Information source: Patient





- Social History


Smoking Status: Current Every Day Smoker


Chew tobacco use (# tins/day): No


Frequency of alcohol use: None


Drug Abuse: None


Lives with: Family


Family History: Arthritis, CAD, DM, Hyperlipidemia, Hypertension, Malignancy, 

Thyroid Disfunction


Patient has suicidal ideation: No


Patient has homicidal ideation: No





- Past Medical History


Cardiac Medical History: 


   Denies: Hx Pulmonary Embolism


Pulmonary Medical History: Reports: Hx Asthma - exercise induced


   Denies: Hx Sleep Apnea, Hx Tuberculosis


Renal/ Medical History: Reports: Hx Ovarian Cysts - PCOS.  Denies: Hx 

Peritoneal Dialysis


GI Medical History: Reports: Hx Gastroesophageal Reflux Disease, Hx Ulcer


Musculoskeltal Medical History: Reports Hx Musculoskeletal Deformity - Scoliosis

, Reports Hx Musculoskeletal Trauma - Sprains of knee and hip


Psychiatric Medical History: Reports: Hx Attention Deficit Hyperactivity 

Disorder, Hx Bipolar Disorder - age 10, Hx Personality Disorder, Hx Post 

Traumatic Stress Disorder, Hx Schizophrenia


   Denies: Hx Depression


Past Surgical History: Reports: Hx Breast Surgery - implants , Hx Cardiac 

Surgery, Hx  Section - x2, Hx Tubal Ligation.  Denies: Hx Hysterectomy, 

Hx Pacemaker





- Immunizations


Immunizations up to date: Yes


Hx Diphtheria, Pertussis, Tetanus Vaccination: Yes - received in 


Hx Pneumococcal Vaccination: 12





Review of Systems





- Review of Systems


Constitutional: No symptoms reported


EENT: No symptoms reported


Cardiovascular: No symptoms reported


Respiratory: No symptoms reported


Gastrointestinal: No symptoms reported


Genitourinary: No symptoms reported


Female Genitourinary: No symptoms reported


Musculoskeletal: See HPI


Skin: No symptoms reported


Hematologic/Lymphatic: No symptoms reported


Neurological/Psychological: See HPI





Physical Exam





- Vital signs


Vitals: 


 











Temp Pulse Resp BP Pulse Ox


 


 98.8 F   101 H  18   113/71   100 


 


 17 20:21  17 20:21  17 20:21  17 20:21  17 20:21











Interpretation: Normal





- General


General appearance: Appears well, Alert


In distress: None - patient lying on the bed, does not appear to be in distress





- HEENT


Head: Normocephalic, Atraumatic


Eyes: Normal


Conjunctiva: Normal


Extraocular movements intact: Yes


Eyelashes: Normal


Pupils: PERRL


Sinus: Normal


Nasal: Normal


Mouth/Lips: Normal


Mucous membranes: Normal


Pharynx: Normal


Neck: Normal





- Respiratory


Respiratory status: No respiratory distress


Chest status: Nontender


Breath sounds: Normal.  No: Decreased air movement, Wheezing


Chest palpation: Normal





- Cardiovascular


Rhythm: Regular.  No: Tachycardia


Heart sounds: Normal auscultation, S1 appreciated, S2 appreciated


Murmur: No





- Abdominal


Inspection: Normal


Distension: No distension


Bowel sounds: Normal


Tenderness: Nontender.  No: Tender, Guarding, Rebound


Organomegaly: No organomegaly





- Rectal


Stool: No: Black, Bloody





- Back


Back: Tender - There is tenderness in the bilateral paraspinal muscles along 

the thoracic and lumbar musculature, I do not appreciate any CVA tenderness, no 

midline tenderness of the back, no saddle anesthesia, normal upper and lower 

extremity strength, distal vascular exam is normal but patient reports she 

cannot feel anything on any parts of her legs..  No: Vertebra tenderness





- Extremities


General upper extremity: Normal inspection, Nontender, Normal color, Normal ROM

, Normal temperature


General lower extremity: Normal inspection, Nontender, Normal color, Normal ROM

, Normal temperature, Normal weight bearing.  No: Esteban's sign





- Neurological


Neuro grossly intact: Yes


Cognition: Normal


Orientation: AAOx4


[place]i Coma Scale Eye Opening: Spontaneous


[place] Coma Scale Verbal: Oriented


[place]i Coma Scale Motor: Obeys Commands


[place] Coma Scale Total: 15


Speech: Normal


Cranial nerves: Normal


Cerebellar coordination: Normal


Motor strength normal: LUE, RUE, LLE, RLE


Additional motor exam normals: Equal 


Sensory: Normal





- Skin


Skin Temperature: Warm


Skin Moisture: Dry


Skin Color: Normal





Course





- Re-evaluation


Re-evalutation: 


Patient initially presenting like she could not stand. She was wheeled in on a 

chair to the room. Reporting total numbness to the leg on neurological exam, 

although able to move with normal strength. LOIS Bello to bedside, checked rectal 

tone after consent by patient, patient not performing any rectal tone. 





I asked nurse in triage if patient had stood, she states she got up to obtain 

her weight and then got back in the wheelchair.  I went back to the patient and 

told her that I was told that she was standing in triage, I asked her to stand, 

she agreed this time, she does have sensation in the lower extremities.  I 

explained her that I am concerned about spinal cord involvement and I need to 

make sure she has good rectal tone and she needs to urinate, if she is unable 

to perform these things she needs an MRI, I am not able to perform an MRI at 

this facility tonight and patient would most likely need to be transferred.  

There is back to bedside, patient performed a normal rectal tone, patient was 

able to give me a urine sample without any difficulty.  Very low suspicion of 

spinal cord involvement or any red flag symptom abnormalities based on this.  I 

do suspect patient has muscular flank pain, possible herniated disc based on 

her symptoms and mechanism.  No indication for imaging.





Urinalysis obtained, shows urinary tract infection.  Discussed with patient, 

advised these could also be involved with her symptoms.  Patient will be placed 

on antibiotics, given Pepcid so she continue the prednisone, discussed follow-up

, discussed return precautions.  Patient and significant other state 

understanding and agreement.





- Vital Signs


Vital signs: 


 











Temp Pulse Resp BP Pulse Ox


 


 98.7 F   83   16   100/67   99 


 


 17 23:10  17 23:10  17 23:10  17 23:10  17 23:10














- Laboratory


Laboratory results interpreted by me: 


 











  17





  21:50


 


Urine Nitrite  POSITIVE H


 


Ur Leukocyte Esterase  TRACE H














Discharge





- Discharge


Clinical Impression: 


Back pain


Qualifiers:


 Back pain location: thoracic back pain Chronicity: unspecified Back pain 

laterality: bilateral Qualified Code(s): M54.6 - Pain in thoracic spine





Urinary tract infection


Qualifiers:


 Urinary tract infection type: site unspecified Hematuria presence: without 

hematuria Qualified Code(s): N39.0 - Urinary tract infection, site not specified





Condition: Stable


Disposition: HOME, SELF-CARE


Additional Instructions: 


Your symptoms are probably a combination of the lower back pain (possibly a 

herniated disc) and a urinary tract infection. 


Take Keflex antibiotic to completion. Take the prednisone as prescribed, take 

the pepcid with it to avoid GI upset. 


Follow up with primary care. 


Return to the emergency department for any concerning or worsening symptoms 

including fever, uncontrolled vomiting, incontinence, new numbness, or any 

other concerning symptoms.


Prescriptions: 


Cephalexin Monohydrate [Keflex 500 mg Capsule] 500 mg PO BID #10 capsule


Famotidine [Pepcid 20 mg Tablet] 20 mg PO BID #12 tablet

## 2017-10-20 ENCOUNTER — HOSPITAL ENCOUNTER (EMERGENCY)
Dept: HOSPITAL 62 - ER | Age: 32
Discharge: HOME | End: 2017-10-20
Payer: SELF-PAY

## 2017-10-20 VITALS — DIASTOLIC BLOOD PRESSURE: 66 MMHG | SYSTOLIC BLOOD PRESSURE: 106 MMHG

## 2017-10-20 DIAGNOSIS — Z91.040: ICD-10-CM

## 2017-10-20 DIAGNOSIS — Z98.51: ICD-10-CM

## 2017-10-20 DIAGNOSIS — M54.42: Primary | ICD-10-CM

## 2017-10-20 DIAGNOSIS — Z88.6: ICD-10-CM

## 2017-10-20 DIAGNOSIS — M54.41: ICD-10-CM

## 2017-10-20 LAB
ALBUMIN SERPL-MCNC: 3.6 G/DL (ref 3.5–5)
ALP SERPL-CCNC: 50 U/L (ref 38–126)
ALT SERPL-CCNC: 24 U/L (ref 9–52)
ANION GAP SERPL CALC-SCNC: 11 MMOL/L (ref 5–19)
APPEARANCE UR: (no result)
AST SERPL-CCNC: 16 U/L (ref 14–36)
BASOPHILS # BLD AUTO: 0.1 10^3/UL (ref 0–0.2)
BASOPHILS NFR BLD AUTO: 0.8 % (ref 0–2)
BILIRUB DIRECT SERPL-MCNC: 0.3 MG/DL (ref 0–0.4)
BILIRUB SERPL-MCNC: 0.3 MG/DL (ref 0.2–1.3)
BILIRUB UR QL STRIP: NEGATIVE
BUN SERPL-MCNC: 9 MG/DL (ref 7–20)
CALCIUM: 9.2 MG/DL (ref 8.4–10.2)
CHLORIDE SERPL-SCNC: 111 MMOL/L (ref 98–107)
CO2 SERPL-SCNC: 24 MMOL/L (ref 22–30)
CREAT SERPL-MCNC: 0.81 MG/DL (ref 0.52–1.25)
EOSINOPHIL # BLD AUTO: 0.2 10^3/UL (ref 0–0.6)
EOSINOPHIL NFR BLD AUTO: 1.9 % (ref 0–6)
ERYTHROCYTE [DISTWIDTH] IN BLOOD BY AUTOMATED COUNT: 14.7 % (ref 11.5–14)
GLUCOSE SERPL-MCNC: 76 MG/DL (ref 75–110)
GLUCOSE UR STRIP-MCNC: NEGATIVE MG/DL
HCT VFR BLD CALC: 39.3 % (ref 36–47)
HGB BLD-MCNC: 13.1 G/DL (ref 12–15.5)
HGB HCT DIFFERENCE: 0
KETONES UR STRIP-MCNC: NEGATIVE MG/DL
LYMPHOCYTES # BLD AUTO: 3.1 10^3/UL (ref 0.5–4.7)
LYMPHOCYTES NFR BLD AUTO: 26.1 % (ref 13–45)
MCH RBC QN AUTO: 30.9 PG (ref 27–33.4)
MCHC RBC AUTO-ENTMCNC: 33.4 G/DL (ref 32–36)
MCV RBC AUTO: 92 FL (ref 80–97)
MONOCYTES # BLD AUTO: 0.7 10^3/UL (ref 0.1–1.4)
MONOCYTES NFR BLD AUTO: 5.9 % (ref 3–13)
NEUTROPHILS # BLD AUTO: 7.8 10^3/UL (ref 1.7–8.2)
NEUTS SEG NFR BLD AUTO: 65.3 % (ref 42–78)
NITRITE UR QL STRIP: POSITIVE
PH UR STRIP: 6 [PH] (ref 5–9)
POTASSIUM SERPL-SCNC: 4.7 MMOL/L (ref 3.6–5)
PROT SERPL-MCNC: 5.9 G/DL (ref 6.3–8.2)
PROT UR STRIP-MCNC: NEGATIVE MG/DL
RBC # BLD AUTO: 4.25 10^6/UL (ref 3.72–5.28)
SODIUM SERPL-SCNC: 145.9 MMOL/L (ref 137–145)
SP GR UR STRIP: 1.02
UROBILINOGEN UR-MCNC: NEGATIVE MG/DL (ref ?–2)
WBC # BLD AUTO: 12 10^3/UL (ref 4–10.5)

## 2017-10-20 PROCEDURE — 87186 SC STD MICRODIL/AGAR DIL: CPT

## 2017-10-20 PROCEDURE — 74176 CT ABD & PELVIS W/O CONTRAST: CPT

## 2017-10-20 PROCEDURE — 96361 HYDRATE IV INFUSION ADD-ON: CPT

## 2017-10-20 PROCEDURE — 99284 EMERGENCY DEPT VISIT MOD MDM: CPT

## 2017-10-20 PROCEDURE — 80053 COMPREHEN METABOLIC PANEL: CPT

## 2017-10-20 PROCEDURE — 96375 TX/PRO/DX INJ NEW DRUG ADDON: CPT

## 2017-10-20 PROCEDURE — 81001 URINALYSIS AUTO W/SCOPE: CPT

## 2017-10-20 PROCEDURE — 84702 CHORIONIC GONADOTROPIN TEST: CPT

## 2017-10-20 PROCEDURE — 85025 COMPLETE CBC W/AUTO DIFF WBC: CPT

## 2017-10-20 PROCEDURE — 87086 URINE CULTURE/COLONY COUNT: CPT

## 2017-10-20 PROCEDURE — 96374 THER/PROPH/DIAG INJ IV PUSH: CPT

## 2017-10-20 PROCEDURE — 36415 COLL VENOUS BLD VENIPUNCTURE: CPT

## 2017-10-20 PROCEDURE — 87088 URINE BACTERIA CULTURE: CPT

## 2017-10-20 NOTE — ER DOCUMENT REPORT
ED Neck/Back Problem





- General


Mode of Arrival: Ambulatory


Information source: Patient


TRAVEL OUTSIDE OF THE [place] IN LAST 30 DAYS: No





- HPI


Patient complains to provider of: Pain, Lower back


Onset: Other - 2.5-3 months ago


Context: Other - see notes above


Associated symptoms: Other - see notes above





<TABATHA DUGGAN - Last Filed: 10/20/17 03:25>





<BRE AKINS - Last Filed: 10/20/17 05:39>





- General


Chief Complaint: Back Pain


Stated Complaint: BACK INJURY/PAIN


Time Seen by Provider: 10/20/17 01:47


Notes: 





32 year old female with history of lower back pain secondary to falling off a 

horse 3 months ago presents to the ED complaining of midline lower back pain 

that started 3 months ago. Patient reports that she has used heating pads and 

lidocaine patches, but to no relief. Patient has been seen in the ED (2017 and 2017) and was told she may have a herniated disc. Since the 

visit the patient states her pain has worsened, that she is falling more, has 

decreased urine output, and has been 'losing' her eyesight. Patient describes 

her pain as a 'shocking, stabbing' feeling and also complains of a 'shaking' 

from head to toe. Patient reports taking Tylenol at [id number] last night. (TABATHA DUGGAN)





- Related Data


Allergies/Adverse Reactions: 


 





latex [Latex] Allergy (Severe, Verified 17 20:21)


 swelling,burning


trazodone [Trazodone] Allergy (Intermediate, Verified 17 20:21)


 Hives


ibuprofen Allergy (Verified 17 20:21)


 


ketorolac [From Toradol] Allergy (Verified 17 20:21)


 


tramadol [Tramadol] Allergy (Verified 17 20:21)


 


ondansetron HCl [From Zofran] Adverse Reaction (Intermediate, Verified 17 

20:21)


 GI upset


hydrocodone bitartrate [From Vicodin] Adverse Reaction (Verified 17 20:21)


 GI upset











Past Medical History





- General


Information source: Patient





- Social History


Smoking Status: Unknown if Ever Smoked


Family History: Arthritis, CAD, DM, Hyperlipidemia, Hypertension, Malignancy, 

Thyroid Disfunction


Patient has suicidal ideation: No


Patient has homicidal ideation: No





- Past Medical History


Cardiac Medical History: 


   Denies: Hx Pulmonary Embolism


Pulmonary Medical History: Reports: Hx Asthma - exercise induced


   Denies: Hx Sleep Apnea, Hx Tuberculosis


Renal/ Medical History: Reports: Hx Ovarian Cysts - PCOS.  Denies: Hx 

Peritoneal Dialysis


GI Medical History: Reports: Hx Gastroesophageal Reflux Disease, Hx Ulcer


Musculoskeltal Medical History: Reports Hx Musculoskeletal Deformity - Scoliosis

, Reports Hx Musculoskeletal Trauma - Sprains of knee and hip


Psychiatric Medical History: Reports: Hx Attention Deficit Hyperactivity 

Disorder, Hx Bipolar Disorder - age 10, Hx Personality Disorder, Hx Post 

Traumatic Stress Disorder, Hx Schizophrenia


   Denies: Hx Depression


Past Surgical History: Reports: Hx Breast Surgery - implants , Hx Cardiac 

Surgery, Hx  Section - x2, Hx Tubal Ligation.  Denies: Hx Hysterectomy, 

Hx Pacemaker





- Immunizations


Immunizations up to date: Yes


Hx Diphtheria, Pertussis, Tetanus Vaccination: Yes - received in 


Hx Pneumococcal Vaccination: 12





<TABATHA DUGGAN - Last Filed: 10/20/17 03:25>





Review of Systems





- Review of Systems


Constitutional: No symptoms reported


EENT: No symptoms reported


Cardiovascular: No symptoms reported


Respiratory: No symptoms reported


Gastrointestinal: No symptoms reported


Genitourinary: See HPI, Other - decreased urine output


Female Genitourinary: No symptoms reported


Musculoskeletal: See HPI, Back pain - lower


Skin: No symptoms reported


Hematologic/Lymphatic: No symptoms reported


Neurological/Psychological: No symptoms reported


-: Yes All other systems reviewed and negative





<TABATHA DUGGAN - Last Filed: 10/20/17 03:25>





Physical Exam





- Vital signs


Interpretation: Normal





- General


General appearance: Appears well, Alert





- HEENT


Head: Normocephalic, Atraumatic


Eyes: Normal


Pupils: PERRL





- Respiratory


Respiratory status: No respiratory distress


Chest status: Nontender


Breath sounds: Normal


Chest palpation: Normal





- Cardiovascular


Rhythm: Regular


Heart sounds: Normal auscultation


Murmur: No





- Abdominal


Inspection: Normal


Distension: No distension


Bowel sounds: Normal


Tenderness: Nontender


Organomegaly: No organomegaly





- Back


Back: Normal, Tender - L3-5 paraspinal b/l





- Extremities


General upper extremity: Normal inspection, Nontender, Normal color, Normal ROM

, Normal temperature


General lower extremity: Normal inspection, Nontender, Normal color, Normal ROM

, Normal temperature, Normal weight bearing.  No: Esteban's sign


Shoulder: Normal


Arm: Normal


Elbow: Normal


Forearm: Normal


Wrist: Normal


Hand: Normal


Hip: Normal


Thigh: Normal


Knee: Normal


Calf: Normal


Ankle: Normal


Foot: Normal





- Neurological


Neuro grossly intact: Yes


Cognition: Normal


Orientation: AAOx4


[place] Coma Scale Eye Opening: Spontaneous


[place] Coma Scale Verbal: Oriented


[place] Coma Scale Motor: Obeys Commands


[place] Coma Scale Total: 15


Speech: Normal


Cranial nerves: Normal


Motor strength normal: LUE, RUE, LLE, RLE


Additional motor exam normals: Equal , Dorsiflexion, Plantar flexion.  No: 

Involuntary movements, Weakness


Sensory: Normal





- Psychological


Associated symptoms: Normal affect, Normal mood





- Skin


Skin Temperature: Warm


Skin Moisture: Dry


Skin Color: Normal





<BRE AKINS - Last Filed: 10/20/17 05:39>





- Vital signs


Vitals: 





 











Temp Pulse Resp BP Pulse Ox


 


 99.5 F   105 H  18   115/70   98 


 


 10/20/17 01:39  10/20/17 01:39  10/20/17 01:39  10/20/17 01:39  10/20/17 01:39














Course





- Laboratory


Result Diagrams: 


 10/20/17 02:30





 10/20/17 02:30





<TABATHA DUGGAN - Last Filed: 10/20/17 03:25>





- Laboratory


Result Diagrams: 


 10/20/17 02:30





 10/20/17 02:30





- Diagnostic Test


Radiology reviewed: Reports reviewed





<BRE AKINS - Last Filed: 10/20/17 05:39>





- Re-evaluation


Re-evalutation: 





10/20/17 


Patient is a 32-year-old female who comes in complaining of lower back pain.  

Patient hurt her back a few months ago and has had exacerbation since then.  

Patient is complaining of pain down her legs.  She is able to ambulate.  No 

urinary retention or incontinence.  No loss of bowel.  Patient has not had an 

MRI and has not followed up with anybody.  Blood work within normal limits.  CT 

with no acute findings.  Patient is completely neurovascularly intact with no 

evidence for cauda equina at this time.  Patient will be given a referral for 

an outpatient MRI and to follow-up with a back specialist.  Return if any 

worsening or concerning symptoms.  Stable for discharge. (BRE AKINS

)





- Vital Signs


Vital signs: 





 











Temp Pulse Resp BP Pulse Ox


 


 99.5 F   105 H  18   115/70   98 


 


 10/20/17 01:39  10/20/17 01:39  10/20/17 01:39  10/20/17 01:39  10/20/17 01:39














- Laboratory


Laboratory results interpreted by me: 





 











  10/20/17 10/20/17 10/20/17





  02:30 02:30 02:30


 


WBC  12.0 H  


 


RDW  14.7 H  


 


Sodium   145.9 H 


 


Chloride   111 H 


 


Total Protein   5.9 L 


 


Urine Nitrite    POSITIVE H














Discharge





<TABATHA DUGGAN - Last Filed: 10/20/17 03:25>





<BRE AKINS - Last Filed: 10/20/17 05:39>





- Discharge


Clinical Impression: 


Low back pain


Qualifiers:


 Chronicity: unspecified Back pain laterality: bilateral Sciatica presence: 

with sciatica Sciatica laterality: bilateral sciatica Qualified Code(s): M54.42 

- Lumbago with sciatica, left side; M54.41 - Lumbago with sciatica, right side; 

M54.41 - Lumbago with sciatica, right side





Condition: Stable


Disposition: HOME, SELF-CARE


Instructions:  Low Back Pain ([facility])


Prescriptions: 


Oxycodone HCl/Acetaminophen [Percocet 5-325 mg Tablet] 1 tab PO Q6HP PRN #15 

tablet


 PRN Reason: 


Carisoprodol [Soma] 350 mg PO DAILYP PRN #10 tablet


 PRN Reason: 


Forms:  Follow-Up Radiology Testing


Referrals: 


MUNIRA MOLINA MD [ACTIVE STAFF] - Follow up in 1 week


Scribe Attestation: 





10/20/17 05:38


I personally performed the services described in the documentation, reviewed 

and edited the documentation which was dictated to the scribe in my presence, 

and it accurately records my words and actions. (BRE AKINS)





Scribe Documentation





- Scribe


Written by Scribe:: Julia Buchanan, 10/20/2017 0331


acting as scribe for :: Bob





<TABATHA DUGGAN - Last Filed: 10/20/17 03:25>

## 2017-10-20 NOTE — RADIOLOGY REPORT ([facility])
EXAM DESCRIPTION:  CT ABD/PELVIS NO ORAL OR IV



COMPLETED DATE/TIME:  10/20/2017 3:40 am



REASON FOR STUDY:  pain in back(T9 to L3) after falling onto fence, nausea



COMPARISON:  CT abdomen and pelvis 12/15/2011.



TECHNIQUE:  CT scan of the abdomen and pelvis performed without intravenous or oral contrast. Images 
reviewed with lung, soft tissue, and bone windows. Reconstructed coronal and sagittal MPR images revi
ewed. All images stored on PACS.

All CT scanners at this facility use dose modulation, iterative reconstruction, and/or weight based d
osing when appropriate to reduce radiation dose to as low as reasonably achievable (ALARA).

[facility]: Dose Right  [facility]: CareDose    [facility]: Dose Right    [facility]: Teradose 4D    [facility]: Smart paOnde



RADIATION DOSE:  Up-to-date CT equipment and radiation dose reduction techniques were employed. CTDIv
ol: 5.5 mGy. DLP: 297 mGy-cm.mGy.



LIMITATIONS:  None.



FINDINGS:  LOWER CHEST: No consolidation or pleural effusion.  Partially visualized breast implants.

NON-CONTRASTED LIVER, SPLEEN, ADRENALS: Evaluation limited by lack of IV contrast. No identified sign
ificant masses.  No perihepatic or perisplenic fluid.

PANCREAS: No peripancreatic inflammatory changes.

GALLBLADDER: Contracted.

RIGHT KIDNEY AND URETER: No perinephric fluid.   No significant calcifications.  Prominent right armen
l pelvis, similar appearance with the comparison study.  No hydronephrosis or hydroureter.

LEFT KIDNEY AND URETER: No perinephric fluid.   Nonobstructing 3 mm calculus at the interpolar region
 of the left kidney.   No hydronephrosis or hydroureter.

AORTA AND RETROPERITONEUM: No abdominal aortic aneurysm. No retroperitoneal masses or hemorrhage.

BOWEL AND PERITONEAL CAVITY: No dilated bowel loops or inflammatory changes. No free fluid or free ai
r.

APPENDIX: Normal.

PELVIS, BLADDER, AND ABDOMINAL WALL:The urinary bladder is partially decompressed.  The uterus is ret
roverted.  No free fluid.  Small fat containing umbilical hernia.

BONES: No acute findings.  The vertebral body heights and alignment are maintained within the visuali
zed thoracolumbar spine.  No compression fracture is noted.



IMPRESSION:  Nonobstructing left nephrolithiasis.  No hydronephrosis.  No acute findings on unenhance
d CT.



COMMENT:  Quality ID # [id number]: Final reports with documentation of one or more dose reduction techniques
 (e.g., Automated exposure control, adjustment of the mA and/or kV according to patient size, use of 
iterative reconstruction technique)



TECHNICAL DOCUMENTATION:  JOB ID:  [id number]

[id number]

 2011 [employer]- All Rights Reserved

## 2017-10-25 ENCOUNTER — HOSPITAL ENCOUNTER (EMERGENCY)
Dept: HOSPITAL 62 - ER | Age: 32
Discharge: HOME | End: 2017-10-25
Payer: SELF-PAY

## 2017-10-25 VITALS — DIASTOLIC BLOOD PRESSURE: 63 MMHG | SYSTOLIC BLOOD PRESSURE: 102 MMHG

## 2017-10-25 DIAGNOSIS — Z91.040: ICD-10-CM

## 2017-10-25 DIAGNOSIS — R55: Primary | ICD-10-CM

## 2017-10-25 DIAGNOSIS — Z88.6: ICD-10-CM

## 2017-10-25 DIAGNOSIS — R42: ICD-10-CM

## 2017-10-25 DIAGNOSIS — M54.6: ICD-10-CM

## 2017-10-25 DIAGNOSIS — M54.42: ICD-10-CM

## 2017-10-25 DIAGNOSIS — Z98.51: ICD-10-CM

## 2017-10-25 LAB
APPEARANCE UR: (no result)
BILIRUB UR QL STRIP: NEGATIVE
GLUCOSE UR STRIP-MCNC: NEGATIVE MG/DL
KETONES UR STRIP-MCNC: NEGATIVE MG/DL
NITRITE UR QL STRIP: NEGATIVE
PH UR STRIP: 6 [PH] (ref 5–9)
PROT UR STRIP-MCNC: NEGATIVE MG/DL
SP GR UR STRIP: 1.02
UROBILINOGEN UR-MCNC: NEGATIVE MG/DL (ref ?–2)

## 2017-10-25 PROCEDURE — 99283 EMERGENCY DEPT VISIT LOW MDM: CPT

## 2017-10-25 PROCEDURE — 81001 URINALYSIS AUTO W/SCOPE: CPT

## 2017-10-25 NOTE — ER DOCUMENT REPORT
ED Neck/Back Problem





- General


Chief Complaint: Back Pain


Stated Complaint: BACK PAIN,DIZZY,FAINTING SPELLS


Time Seen by Provider: 10/25/17 16:09


Mode of Arrival: Ambulatory


Information source: Patient


Notes: 





32-year-old female presents to ED for complaint of upper back pain 2 days 

after she was trying to  a cat litter bag which caused her to have 

immediate back pain radiating down her lower back immediate dizziness and 

nausea with pain in her left leg.  She has a history of frequent back pain and 

frequent visits to the emergency room.


TRAVEL OUTSIDE OF THE [place] IN LAST 30 DAYS: No





- HPI


Patient complains to provider of: Pain, Upper back, Lower back.  No: Injury


Onset: Other - 2 days ago


Where: Home


Onset: Sudden


Timing: Waxing and waning, Still present


Quality of pain: Sharp


Severity: Severe


Pain Level: 5


Context: Lifting, Turning


Recent injury: Possibly


Associated symptoms: Like prior neck/back pain, Radiation to leg, Lower back 

pain, Upper back pain.  denies: Incontinence, Motor loss, Numbness/tingling, 

Sensory loss, Sweaty, Unable to urinate


Exacerbated by: Movement of trunk


Relieved by: Nothing


Similar symptoms previously: Yes


Recently seen / treated by doctor: Yes





- Related Data


Allergies/Adverse Reactions: 


 





latex [Latex] Allergy (Severe, Verified 10/25/17 15:07)


 swelling,burning


trazodone [Trazodone] Allergy (Intermediate, Verified 10/25/17 15:07)


 Hives


ibuprofen Allergy (Verified 10/25/17 15:07)


 


ketorolac [From Toradol] Allergy (Verified 10/25/17 15:07)


 


tramadol [Tramadol] Allergy (Verified 10/25/17 15:07)


 


ondansetron HCl [From Zofran] Adverse Reaction (Intermediate, Verified 10/25/17 

15:07)


 GI upset


hydrocodone bitartrate [From Vicodin] Adverse Reaction (Verified 10/25/17 15:07)


 GI upset











Past Medical History





- General


Information source: Patient





- Social History


Smoking Status: Unknown if Ever Smoked


Frequency of alcohol use: None


Drug Abuse: None


Lives with: Family


Family History: Arthritis, CAD, DM, Hyperlipidemia, Hypertension, Malignancy, 

Thyroid Disfunction


Patient has suicidal ideation: No


Patient has homicidal ideation: No





- Past Medical History


Cardiac Medical History: Reports: None


Pulmonary Medical History: Reports: Hx Asthma - exercise induced


EENT Medical History: Reports: None


Neurological Medical History: Reports: None


Endocrine Medical History: Reports: None


Renal/ Medical History: Reports: Hx Ovarian Cysts - PCOS


Malignancy Medical History: Reports: None


GI Medical History: Reports: Hx Gastroesophageal Reflux Disease, Hx Ulcer


Musculoskeltal Medical History: Reports Hx Musculoskeletal Deformity - Scoliosis

, Reports Hx Musculoskeletal Trauma - Sprains of knee and hip


Skin Medical History: Reports None


Psychiatric Medical History: Reports: Hx Attention Deficit Hyperactivity 

Disorder, Hx Bipolar Disorder - age 10, Hx Personality Disorder, Hx Post 

Traumatic Stress Disorder, Hx Schizophrenia


Traumatic Medical History: Reports: None


Infectious Medical History: Reports: None


Past Surgical History: Reports: Hx Breast Surgery - implants , Hx Cardiac 

Surgery, Hx  Section - x2, Hx Tubal Ligation





- Immunizations


Immunizations up to date: Yes


Hx Diphtheria, Pertussis, Tetanus Vaccination: Yes - received in 


Hx Pneumococcal Vaccination: 12





Review of Systems





- Review of Systems


Constitutional: No symptoms reported


EENT: No symptoms reported


Cardiovascular: Syncope, Dizziness


Respiratory: No symptoms reported


Gastrointestinal: No symptoms reported


Genitourinary: No symptoms reported


Female Genitourinary: No symptoms reported


Musculoskeletal: Back pain, Muscle pain, Muscle stiffness


Skin: No symptoms reported


Hematologic/Lymphatic: No symptoms reported


Neurological/Psychological: Gait changes


-: Yes All other systems reviewed and negative





Physical Exam





- Vital signs


Vitals: 


 











Temp Pulse Resp BP Pulse Ox


 


 99.2 F   105 H  20   102/63   100 


 


 10/25/17 15:08  10/25/17 15:08  10/25/17 15:08  10/25/17 15:08  10/25/17 15:08











Interpretation: Normal





- General


General appearance: Appears well, Alert





- HEENT


Head: Normocephalic, Atraumatic


Eyes: Normal


Pupils: PERRL





- Respiratory


Respiratory status: No respiratory distress


Chest status: Nontender


Breath sounds: Normal


Chest palpation: Normal





- Cardiovascular


Rhythm: Regular


Heart sounds: Normal auscultation


Murmur: No





- Abdominal


Inspection: Normal


Distension: No distension


Bowel sounds: Normal


Tenderness: Nontender


Organomegaly: No organomegaly





- Back


Back: Normal, Tender.  No: Deformity/step-off, CVA tenderness, Vertebra 

tenderness, Scars, Scoliosis, Wounds





- Extremities


General upper extremity: Normal inspection, Nontender, Normal color, Normal ROM

, Normal temperature


General lower extremity: Normal inspection, Nontender, Normal color, Normal ROM

, Normal temperature, Normal weight bearing.  No: Esteban's sign





- Neurological


Neuro grossly intact: Yes


Cognition: Normal


Orientation: AAOx4


[place] Coma Scale Eye Opening: Spontaneous


[place] Coma Scale Verbal: Oriented


[place] Coma Scale Motor: Obeys Commands


Simi Coma Scale Total: 15


Speech: Normal


Motor strength normal: LUE, RUE, LLE, RLE


Sensory: Normal





- Psychological


Associated symptoms: Normal affect, Normal mood





- Skin


Skin Temperature: Warm


Skin Moisture: Dry


Skin Color: Normal





Course





- Re-evaluation


Re-evalutation: 





10/25/17 19:56


Patient able to get up and walk to the bathroom with no limping.  Patient was 

able to get up sit on the side of the bed and move freely.  Explained to 

patient that her urine was negative and that she needs to follow-up with her 

primary doctor who can continue to follow her symptoms as they change so they 

can determine what tests she does not does not need.  Patient was discharged 

home to follow-up with a primary doctor given a list of the local doctors.  No 

acute distress noted during her stay.  Patient was able to move freely.  She 

had no loss of motor control no loss of control of bowel bladder no saddle 

anesthesia no signs of cauda equina no acute injuries.





- Vital Signs


Vital signs: 


 











Temp Pulse Resp BP Pulse Ox


 


 99.2 F   105 H  20   102/63   100 


 


 10/25/17 15:08  10/25/17 15:08  10/25/17 15:08  10/25/17 15:08  10/25/17 15:08














- Laboratory


Laboratory results interpreted by me: 


 











  10/25/17





  16:54


 


Urine Blood  MODERATE H














Discharge





- Discharge


Clinical Impression: 


 chronic fainting spells, Upper back pain





Chronic back pain


Qualifiers:


 Back pain location: low back pain Back pain laterality: left Sciatica presence

: with sciatica Sciatica laterality: sciatica of left side Qualified Code(s): 

M54.42 - Lumbago with sciatica, left side





Condition: Stable


Disposition: HOME, SELF-CARE


Instructions:  Family Physicians / Practices, Use of Over-The-Counter Ibuprofen 

([facility]), Stretching Exercises for the Back ([facility])


Additional Instructions: 


Chronic Back Pain





     Chronic back pain (pain persisting longer than three months) is a common 

problem. A medical evaluation can look for herniated disc, arthritis, 

osteoporosis, tumors, and infections. But at least half the time, there's no 

obvious treatable cause. Anxiety and depression tend to worsen back pain.


     Ibuprofen or other anti-inflammatory medicine can help. A heating pad, 

used for 15-20 minutes at a time, can ease pain. For this type of back pain, 

narcotic medicines should be avoided. Muscle relaxers are rarely helpful unless 

you're having spasms.


     Activity is important. Find an aerobic exercise program that your back can 

tolerate. Too much rest makes back pain worse. Specific back exercises are 

usually prescribed to strengthen the back and abdominal muscles. Often, a 

physical therapist can help. Avoid heavy lifting, working while bent over, or 

standing with both knees straight.


     Most back pain patients do better with a firm mattress.


     If new symptoms of a "herniated disc" (radiation of pain, numbness, or 

tingling down the back of the leg or weakness in the leg) occur, you should be 

re-examined.





Chronic Pain Control





     Stress, inactivity, and depression make pain more severe regardless of the 

cause of the pain.  Stress and poor physical condition can cause pain such as 

headaches and backache.


     Relaxation:  Rest in a quiet place with your eyes closed for 20 minutes 

twice daily.  Concentrate on a pleasant image, or simply "feel" your breathing.

  Clear your mind.


     Stress management:  Deal with your "stressors."  Either take action, or 

eliminate the stressor from your life.  Don't let things hang over you.  Accept 

those things you can't change.


     Nutrition:  Eat small, balanced meals -- don't skip, don't overeat.  Meals 

should be high-carbohydrate, low-sugar, low-fat.


     Exercise:  Exercise helps painful conditions and eases stress. Get 30 

minutes of moderate exercise, five days a week. Do an activity that does not 

flare your pain.


     Precautions:  Pain which continues to disrupt daily activities, or which 

changes in nature, requires a medical evaluation.  Pain Clinic referral is 

available.





     


We do not manage chronic pain in the Emergency Department.  We will try to 

appropriately help you through an acute flare of your chronic painful condition

, but for on-going chronic pain that does not improve, you will need to see 

your private doctor or pain management specialist.  We do not provide repeated 

medication management of chronic painful conditions.  If you wish, we can 

provide the name of local pain management physicians.





Syncopal Episode





     Syncope (fainting or near-fainting) can occur from many different health 

problems.  Or it can be a simple fainting spell requiring no treatment.  It is 

safe for you to go home, but further evaluation will likely be necessary.


     Your work-up may include tests for internal bleeding, heart disease, 

medication problems, or near-strokes.  Tests are not always required, however, 

depending on the nature of your problem.


     The warning signs of an impending faint include: dizziness, lightheadedness

, nausea, hot flashes, tingling, and weakness.  If this happens, lay down and 

put your feet up, then wait until all of these symptoms have passed before 

standing up again.


     If these episodes become recurrent, or if you develop chest pain, heart 

palpitations, mental confusion, blurred vision, or headache, then you should 

call the physician, or go to the emergency room.





Acetaminophen





     Acetaminophen may be taken for pain relief or fever control. It's much 

safer than aspirin, offering a wider range of "safe" dosages.  It is safe 

during pregnancy.  Some brand names are Tylenol, Panadol, Datril, Anacin 3, 

Tempra, and Liquiprin. Acetaminophen can be repeated every four hours.  The 

following are maximum recommended dosages:





WEIGHT         Dose             Drops                  Elixir        Chewable(

80mg)


(LBS.)                            drprs=droppers    tsp=teaspoon


6                 40 mg            .4 ml (1/2)


6-11            80 mg            .8 ml (full)            1/2   tsp           1 

      tab


12-16         120 mg           1 1/2 drprs            3/4   tsp           1 1/2

  tabs


17-23         160 mg             2  drprs              1      tsp           2  

     tabs


24-30         240 mg             3  drprs              1 1/2 tsp           3   

    tabs


30-35         320 mg                                     2       tsp           

4       tabs


36-41         360 mg                                     2 1/4 tsp           4 1

/2  tabs


42-47         400 mg                                     2 1/2 tsp           5 

      tabs


48-53         480 mg                                     3       tsp          6

       tabs


54-59         520 mg                                     3  1/4 tsp          6 1

/2 tabs


60-64         560 mg                                     3  1/2 tsp          7 

     tabs 


65-70         600 mg                                     3  3/4 tsp          7 1

/2 tabs


71-76         640 mg                                     4       tsp           

8      tabs


77-82         720 mg                                     4 1/2  tsp           9

      tabs


83-88         800 mg                                     5       tsp           

10     tabs





>89 pounds or adults          650 mg to 900 mg 





Acetaminophen can be repeated every four hours. Maximum daily dose not to 

exceed 4000 mg.





   These maximum recommended dosages are slightly higher than the dosages 

written on the product container, but these dosages are very safe and well 

below the toxic dosage for acetaminophen.








FOLLOW-UP CARE:


If you have been referred to a physician for follow-up care, call the physician

s office for an appointment as you were instructed or within the next two days.

  If you experience worsening or a significant change in your symptoms, notify 

the physician immediately or return to the Emergency Department at any time for 

re-evaluation.


Referrals: 


[facility] [Provider Group] - Follow up as needed

## 2017-10-31 ENCOUNTER — HOSPITAL ENCOUNTER (EMERGENCY)
Dept: HOSPITAL 62 - ER | Age: 32
Discharge: HOME | End: 2017-10-31
Payer: SELF-PAY

## 2017-10-31 VITALS — SYSTOLIC BLOOD PRESSURE: 102 MMHG | DIASTOLIC BLOOD PRESSURE: 63 MMHG

## 2017-10-31 DIAGNOSIS — Z98.51: ICD-10-CM

## 2017-10-31 DIAGNOSIS — Z91.040: ICD-10-CM

## 2017-10-31 DIAGNOSIS — Z87.891: ICD-10-CM

## 2017-10-31 DIAGNOSIS — K02.9: Primary | ICD-10-CM

## 2017-10-31 DIAGNOSIS — R68.84: ICD-10-CM

## 2017-10-31 DIAGNOSIS — Z88.6: ICD-10-CM

## 2017-10-31 PROCEDURE — 99282 EMERGENCY DEPT VISIT SF MDM: CPT

## 2017-10-31 NOTE — ER DOCUMENT REPORT
ED Oral Problem





- General


Chief Complaint: Toothache


Stated Complaint: TOOTH PAIN


Time Seen by Provider: 10/31/17 15:38


Mode of Arrival: Ambulatory


Information source: Patient


Notes: 





32-year-old female presents to ED for complaint of left-sided mouth pain 4 

days.  She states that the pain made her jaw hurt up into her ear.  She has a 

little white hard spot on the back of her gum where her tooth #17 has been 

removed.


TRAVEL OUTSIDE OF THE [place] IN LAST 30 DAYS: No





- HPI


Patient complains to provider of: Jaw pain, Toothache


Onset: Other - 4 days


Onset: Gradual


Quality of pain: Other - Achy throbbing


Severity: Moderate


Pain Level: 4


Associated symptoms: Jaw pain, Toothache


Worsened by: Cold


Relieved by: Nothing


Similar symptoms previously: No


Recently seen / treated by doctor/dentist: Yes





- Related Data


Allergies/Adverse Reactions: 


 





latex [Latex] Allergy (Severe, Verified 10/31/17 15:01)


 swelling,burning


trazodone [Trazodone] Allergy (Intermediate, Verified 10/31/17 15:01)


 Hives


ibuprofen Allergy (Verified 10/31/17 15:01)


 


ketorolac [From Toradol] Allergy (Verified 10/31/17 15:01)


 


tramadol [Tramadol] Allergy (Verified 10/31/17 15:01)


 


ondansetron HCl [From Zofran] Adverse Reaction (Intermediate, Verified 10/31/17 

15:01)


 GI upset


hydrocodone bitartrate [From Vicodin] Adverse Reaction (Verified 10/31/17 15:01)


 GI upset











Past Medical History





- General


Information source: Patient





- Social History


Smoking Status: Former Smoker


Cigarette use (# per day): No


Chew tobacco use (# tins/day): No


Smoking Education Provided: No


Frequency of alcohol use: None


Drug Abuse: None


Lives with: Family


Family History: Arthritis, CAD, DM, Hyperlipidemia, Hypertension, Malignancy, 

Thyroid Disfunction


Patient has suicidal ideation: No


Patient has homicidal ideation: No





- Past Medical History


Cardiac Medical History: Reports: None


Pulmonary Medical History: Reports: Hx Asthma - exercise induced


EENT Medical History: Reports: None


Neurological Medical History: Reports: None


Endocrine Medical History: Reports: None


Renal/ Medical History: Reports: Hx Ovarian Cysts - PCOS


Malignancy Medical History: Reports: None


GI Medical History: Reports: Hx Gastroesophageal Reflux Disease, Hx Ulcer


Musculoskeltal Medical History: Reports Hx Musculoskeletal Deformity - Scoliosis

, Reports Hx Musculoskeletal Trauma - Sprains of knee and hip


Skin Medical History: Reports None


Psychiatric Medical History: Reports: Hx Attention Deficit Hyperactivity 

Disorder, Hx Bipolar Disorder - age 10, Hx Personality Disorder, Hx Post 

Traumatic Stress Disorder, Hx Schizophrenia


Traumatic Medical History: Reports: None


Infectious Medical History: Reports: None


Past Surgical History: Reports: Hx Breast Surgery - implants 2004, Hx Cardiac 

Surgery, Hx  Section - x2, Hx Tubal Ligation





- Immunizations


Immunizations up to date: Yes


Hx Diphtheria, Pertussis, Tetanus Vaccination: Yes - received in 


Hx Pneumococcal Vaccination: 12





Review of Systems





- Review of Systems


Constitutional: No symptoms reported


EENT: Mouth pain, Mouth swelling


Cardiovascular: No symptoms reported


Respiratory: No symptoms reported


Gastrointestinal: No symptoms reported


Genitourinary: No symptoms reported


Female Genitourinary: No symptoms reported


Musculoskeletal: No symptoms reported


Skin: No symptoms reported


Hematologic/Lymphatic: No symptoms reported


Neurological/Psychological: No symptoms reported


-: Yes All other systems reviewed and negative





Physical Exam





- Vital signs


Vitals: 


 











Temp Pulse Resp BP Pulse Ox


 


 98.6 F   101 H  16   102/63   99 


 


 10/31/17 15:01  10/31/17 15:01  10/31/17 15:01  10/31/17 15:01  10/31/17 15:01











Interpretation: Normal





- General


General appearance: Appears well, Alert





- HEENT


Head: Normocephalic, Atraumatic


Eyes: Normal


Pupils: PERRL


Mouth/Lips: Caries


Teeth diagram: 


  __________________________














  __________________________





 1 - Swelling to the thumb beside tooth 18 with small cavity and a small white 

chip of what could be a tooth root in the area of tooth #17





Pharynx: Normal


Neck: Normal





- Respiratory


Respiratory status: No respiratory distress


Chest status: Nontender


Breath sounds: Normal


Chest palpation: Normal





- Cardiovascular


Rhythm: Regular


Heart sounds: Normal auscultation


Murmur: No





- Abdominal


Inspection: Normal


Distension: No distension


Bowel sounds: Normal


Tenderness: Nontender


Organomegaly: No organomegaly





- Back


Back: Normal, Nontender





- Extremities


General upper extremity: Normal inspection, Nontender, Normal color, Normal ROM

, Normal temperature


General lower extremity: Normal inspection, Nontender, Normal color, Normal ROM

, Normal temperature, Normal weight bearing.  No: Esteban's sign





- Neurological


Neuro grossly intact: Yes


Cognition: Normal


Orientation: AAOx4


Simi Coma Scale Eye Opening: Spontaneous


Simi Coma Scale Verbal: Oriented


[place] Coma Scale Motor: Obeys Commands


Simi Coma Scale Total: 15


Speech: Normal


Motor strength normal: LUE, RUE, LLE, RLE


Sensory: Normal





- Psychological


Associated symptoms: Normal affect, Normal mood





- Skin


Skin Temperature: Warm


Skin Moisture: Dry


Skin Color: Normal





Course





- Re-evaluation


Re-evalutation: 





10/31/17 17:15


Patient was treated with penicillin and lidocaine viscous for her pain and 

discharged home with prescription for penicillin.





- Vital Signs


Vital signs: 


 











Temp Pulse Resp BP Pulse Ox


 


 98.6 F   101 H  16   102/63   99 


 


 10/31/17 15:01  10/31/17 15:01  10/31/17 15:01  10/31/17 15:01  10/31/17 15:01














Discharge





- Discharge


Clinical Impression: 


 Pain due to dental caries





Condition: Stable


Disposition: HOME, SELF-CARE


Additional Instructions: 


TOOTHACHE:





     Your pain is due to dental decay.  The tooth must be repaired in order for 

you to feel better.  You will, therefore, be referred to a dentist.  We do not 

have dentists on the staff at [facility].


     Severe swelling or drainage around a tooth usually means a dental abscess.

  This also requires evaluation and treatment by the dentist, but antibiotics 

may be prescribed while awaiting dental treatment.


     You should be rechecked immediately if you develop major swelling of the 

face, increasing pain, a lump in the jaw or gums, headache, difficulty 

swallowing, or fever.








PENICILLIN V K:


     You have been given a prescription for Penicillin VK.  Your physician has 

determined that this is the best antibiotic for your condition.


     Pen VK can be taken with meals, however more of the antibiotic gets into 

the bloodstream if it's taken on an empty stomach.


     Penicillin usually has no side effects.  However, allergy to penicillins 

is common.  If you have had an allergic reaction to any drug of the penicillin 

family, you should never take any other penicillin.  Notify your doctor at once 

if you develop hives, itching, swelling, faintness, or shortness of breath.








FOLLOW-UP CARE:


     You have been referred for follow-up care to the dentists listed below.  

Call the dentists office for an appointment as you were instructed or within 

the next two days.  If you experience worsening or a significant change in your 

symptoms, notify the physician immediately or return to the Emergency 

Department at any time for re-evaluation.





[facility]


[address] [place], [place]


[phone]


Friday mornings, by appointment





[facility]


[address] [place] [address] [phone]





[facility] access [facility]


[address] [place] \[facility].\""


[phone]





[facility]


[address] [facility] [address] [place]


[phone]





[facility]


[address] [facility]


[phone]


[email or web address]





[facility]


[address] [place]  [address] [phone]


Monday-  8:00am to 5:00 pm


Will see patients from other [facility].


Charges based on income and family size and


accepts Medicare, Medicaid, and Insurances


Will pull molars





[facility]


Student Clinics


[facility] [address] [phone]


Hours of Operation


   8:00 am - 4:30 pm weekdays





The following dental offices accept Medicaid:





   [facility]   [phone]


   Dr. Villarreal         [phone]


   Dr. Fitch         [phone]


   Dr. Troy         [phone]


   Dr. Yanes         [phone]


   [facility]


      oral surgery      [phone]


   Dr. Narayanan ([place])      [phone]


   Dr. Bower ([place])   [phone]


   [facility]      [phone]


   [facility] ([place])   [phone]


   Dr. Deluca ([place])      [phone]


   [facility]      [phone]


   [facility]   [phone]


   [facility]   [phone]


   Dr. Reaves ([place])      [phone]


   [facility] and 


      ([place])      [phone]





   Medicaid Care Line      [phone]


Prescriptions: 


Penicillin V Potassium [Penicillin Vk 500 mg Tablet] 500 mg PO BID #20 tablet

## 2017-12-18 ENCOUNTER — HOSPITAL ENCOUNTER (EMERGENCY)
Dept: HOSPITAL 62 - ER | Age: 32
Discharge: HOME | End: 2017-12-18
Payer: SELF-PAY

## 2017-12-18 VITALS — DIASTOLIC BLOOD PRESSURE: 71 MMHG | SYSTOLIC BLOOD PRESSURE: 111 MMHG

## 2017-12-18 DIAGNOSIS — S76.012A: Primary | ICD-10-CM
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
## 2017-12-18 DIAGNOSIS — F17.200: ICD-10-CM

## 2017-12-18 DIAGNOSIS — Z91.040: ICD-10-CM

## 2017-12-18 DIAGNOSIS — Z98.51: ICD-10-CM

## 2017-12-18 PROCEDURE — 99284 EMERGENCY DEPT VISIT MOD MDM: CPT

## 2017-12-18 PROCEDURE — 96374 THER/PROPH/DIAG INJ IV PUSH: CPT

## 2017-12-18 PROCEDURE — 73700 CT LOWER EXTREMITY W/O DYE: CPT

## 2017-12-18 PROCEDURE — 73502 X-RAY EXAM HIP UNI 2-3 VIEWS: CPT

## 2017-12-18 PROCEDURE — 96376 TX/PRO/DX INJ SAME DRUG ADON: CPT

## 2017-12-18 PROCEDURE — 96375 TX/PRO/DX INJ NEW DRUG ADDON: CPT

## 2017-12-18 PROCEDURE — 96361 HYDRATE IV INFUSION ADD-ON: CPT

## 2017-12-18 NOTE — RADIOLOGY REPORT (SQ)
EXAM DESCRIPTION:  HIP LEFT AP/LATERAL



COMPLETED DATE/TIME:  12/18/2017 3:06 pm



REASON FOR STUDY:  fall/pain



COMPARISON:  None.



NUMBER OF VIEWS:  Two views.



TECHNIQUE:  AP pelvis and additional frog-leg view of the left hip.



LIMITATIONS:  None.



FINDINGS:  MINERALIZATION: Normal.

LEFT HIP: No fracture or dislocation.  No worrisome bone lesions.

RIGHT HIP: No fracture or dislocation.  No worrisome bone lesions.

PUBIS AND ISCHIUM: No fracture.

PELVIS: No fracture.

SACRUM: No fracture or dislocation. No worrisome bone lesions.

LOWER LUMBAR SPINE: No fracture or dislocation. No worrisome bone lesions.  No significant disc disea
se.

SOFT TISSUES: No findings.

OTHER: No other significant finding.



IMPRESSION:  NEGATIVE STUDY OF THE LEFT HIP AND PELVIS. NO RADIOGRAPHIC EVIDENCE OF ACUTE INJURY.



TECHNICAL DOCUMENTATION:  JOB ID:  [id number]

 2011 [employer]- All Rights Reserved

## 2017-12-18 NOTE — ER DOCUMENT REPORT
ED Fall





- General


Chief Complaint: Fall Injury


Stated Complaint: HIP PAIN


Time Seen by Provider: 17 13:39


Mode of Arrival: Ambulatory


Information source: Patient


Notes: 





Patient states that she slipped and fell in her bathroom at home.  She states 

that her left leg got bent back underneath her body.  She states she has severe 

hip pain radiating into her left knee.  It is constant.  It is worse with 

movement and better with rest.  It is radiating down the left leg.  She denies 

any loss of consciousness.


TRAVEL OUTSIDE OF THE [place] IN LAST 30 DAYS: No





- Related data


Allergies/Adverse Reactions: 


 





latex [Latex] Allergy (Severe, Verified 17 13:01)


 swelling,burning


trazodone [Trazodone] Allergy (Intermediate, Verified 17 13:01)


 Hives


ibuprofen Allergy (Verified 17 13:01)


 


ketorolac [From Toradol] Allergy (Verified 17 13:01)


 


tramadol [Tramadol] Allergy (Verified 17 13:01)


 


ondansetron HCl [From Zofran] Adverse Reaction (Intermediate, Verified 17 

13:01)


 GI upset


hydrocodone bitartrate [From Vicodin] Adverse Reaction (Verified 17 13:01)


 GI upset








Home Medications: 


 Current Home Medications





Acetaminophen [Tylenol] 1,000 mg PO DAILY 17 [History]











Past Medical History





- General


Information source: Patient





- Social History


Smoking Status: Current Every Day Smoker


Chew tobacco use (# tins/day): No


Frequency of alcohol use: None


Drug Abuse: None


Family History: Arthritis, CAD, DM, Hyperlipidemia, Hypertension, Malignancy, 

Thyroid Disfunction


Patient has suicidal ideation: No


Patient has homicidal ideation: No





- Past Medical History


Cardiac Medical History: 


   Denies: Hx Pulmonary Embolism


Pulmonary Medical History: Reports: Hx Asthma - exercise induced


   Denies: Hx Sleep Apnea, Hx Tuberculosis


Renal/ Medical History: Reports: Hx Ovarian Cysts - PCOS.  Denies: Hx 

Peritoneal Dialysis


GI Medical History: Reports: Hx Gastroesophageal Reflux Disease, Hx Ulcer


Musculoskeltal Medical History: Reports Hx Musculoskeletal Deformity - Scoliosis

, Reports Hx Musculoskeletal Trauma - Sprains of knee and hip


Psychiatric Medical History: Reports: Hx Attention Deficit Hyperactivity 

Disorder, Hx Bipolar Disorder - age 10, Hx Personality Disorder, Hx Post 

Traumatic Stress Disorder, Hx Schizophrenia


   Denies: Hx Depression


Past Surgical History: Reports: Hx Breast Surgery - implants , Hx Cardiac 

Surgery, Hx  Section - x2, Hx Tubal Ligation.  Denies: Hx Hysterectomy, 

Hx Pacemaker





- Immunizations


Immunizations up to date: Yes


Hx Diphtheria, Pertussis, Tetanus Vaccination: Yes - received in 


Hx Pneumococcal Vaccination: 12





Review of Systems





- Review of Systems


Constitutional: denies: Chills, Fever


Cardiovascular: denies: Chest pain, Palpitations


Respiratory: denies: Cough, Short of breath


Gastrointestinal: denies: Diarrhea, Vomiting


-: Yes All other systems reviewed and negative





Physical Exam





- Vital signs


Vitals: 





 











Temp Pulse Resp BP Pulse Ox


 


 98.8 F   135 H  24 H  97/71 L  99 


 


 17 13:05  17 13:05  17 13:05  17 13:05  17 13:05











Interpretation: Normal





- General


General appearance: Appears well, Alert





- HEENT


Head: Normocephalic, Atraumatic


Eyes: Normal


Pupils: PERRL





- Respiratory


Respiratory status: No respiratory distress


Chest status: Nontender


Breath sounds: Normal


Chest palpation: Normal





- Cardiovascular


Rhythm: Regular


Heart sounds: Normal auscultation


Murmur: No





- Abdominal


Inspection: Normal


Distension: No distension


Bowel sounds: Normal


Tenderness: Nontender


Organomegaly: No organomegaly





- Back


Back: Normal, Nontender





- Extremities


General upper extremity: Normal inspection, Nontender, Normal color, Normal ROM

, Normal temperature


General lower extremity: Normal inspection, Nontender, Normal color, Normal ROM

, Normal temperature, Normal weight bearing.  No: Esteban's sign





- Neurological


Neuro grossly intact: Yes


Cognition: Normal


Orientation: AAOx4


Simi Coma Scale Eye Opening: Spontaneous


[place] Coma Scale Verbal: Oriented


[place]i Coma Scale Motor: Obeys Commands


[place] Coma Scale Total: 15


Speech: Normal


Motor strength normal: LUE, RUE, LLE, RLE


Sensory: Normal





- Psychological


Associated symptoms: Normal affect, Normal mood





- Skin


Skin Temperature: Warm


Skin Moisture: Dry


Skin Color: Normal





Course





- Vital Signs


Vital signs: 





 











Temp Pulse Resp BP Pulse Ox


 


 98.8 F   135 H  24 H  97/71 L  99 


 


 17 13:05  17 13:05  17 13:05  17 13:05  17 13:05














- Diagnostic Test


Radiology reviewed: Image reviewed, Reports reviewed - Both the x-ray and CT 

scans are reviewed by me.  No evidence of fracture dislocation.





Discharge





- Discharge


Clinical Impression: 


Strain of left hip


Qualifiers:


 Encounter type: initial encounter Qualified Code(s): S76.012A - Strain of 

muscle, fascia and tendon of left hip, initial encounter





Condition: Stable


Disposition: HOME, SELF-CARE


Instructions:  Muscle Strain ([facility])


Prescriptions: 


Oxycodone HCl/Acetaminophen [Percocet 5-325 mg Tablet] 1 - 2 tab PO Q4H PRN #15 

tablet


 PRN Reason: 


Forms:  Return to Work


Referrals: 


KIMBERLYN LYN MD [ACTIVE STAFF] - Follow up in 1 week

## 2017-12-18 NOTE — RADIOLOGY REPORT ([facility])
EXAM DESCRIPTION:  CT LT LOWER EXTREMITY WITHOUT



COMPLETED DATE/TIME:  12/18/2017 5:16 pm



REASON FOR STUDY:  neg xray, severe pain/fall



COMPARISON:  None.



TECHNIQUE:  CT scan of the left hip performed without intravenous or oral contrast.  Images reviewed 
with soft tissue and bone windows.  Reconstructed coronal and sagittal MPR images reviewed.  All imag
es stored on PACS.

All CT scanners at this facility use dose modulation, iterative reconstruction, and/or weight based d
osing when appropriate to reduce radiation dose to as low as reasonably achievable (ALARA).

[facility]: Dose Right  [facility]: CareDose    [facility]: Dose Right    [facility]: Teradose 4D    [facility]: Smart Catbird



RADIATION DOSE:   mGy.



LIMITATIONS:  None.



FINDINGS:  VISUALIZE PELVIC BONES: No acute fracture. No worrisome bone lesions.

SYMPTOMATIC HIP: No acute fracture or dislocation. No worrisome bone lesions.

OPPOSITE HIP: Not included field of view.

PELVIC SOFT TISSUES: No significant findings.

EXTRAPELVIC SOFT TISSUES: No significant findings.

OTHER: No other significant finding.



IMPRESSION:  NO ACUTE ABNORMALITY OF THE LEFT HIP.



TECHNICAL DOCUMENTATION:  JOB ID:  [id number]

Quality ID # [id number]: Final reports with documentation of one or more dose reduction techniques (e.g., Au
tomated exposure control, adjustment of the mA and/or kV according to patient size, use of iterative 
reconstruction technique)

 2011 [employer]- All Rights Reserved

## 2018-01-23 ENCOUNTER — HOSPITAL ENCOUNTER (EMERGENCY)
Dept: HOSPITAL 62 - ER | Age: 33
Discharge: HOME | End: 2018-01-23
Payer: SELF-PAY

## 2018-01-23 VITALS — DIASTOLIC BLOOD PRESSURE: 77 MMHG | SYSTOLIC BLOOD PRESSURE: 114 MMHG

## 2018-01-23 DIAGNOSIS — Z91.040: ICD-10-CM

## 2018-01-23 DIAGNOSIS — Z88.6: ICD-10-CM

## 2018-01-23 DIAGNOSIS — F17.200: ICD-10-CM

## 2018-01-23 DIAGNOSIS — Z98.51: ICD-10-CM

## 2018-01-23 DIAGNOSIS — K29.70: Primary | ICD-10-CM

## 2018-01-23 LAB
ADD MANUAL DIFF: NO
ALBUMIN SERPL-MCNC: 4.8 G/DL (ref 3.5–5)
ALP SERPL-CCNC: 77 U/L (ref 38–126)
ALT SERPL-CCNC: 31 U/L (ref 9–52)
ANION GAP SERPL CALC-SCNC: 11 MMOL/L (ref 5–19)
APPEARANCE UR: (no result)
APTT PPP: YELLOW S
AST SERPL-CCNC: 20 U/L (ref 14–36)
BARBITURATES UR QL SCN: NEGATIVE
BASOPHILS # BLD AUTO: 0.1 10^3/UL (ref 0–0.2)
BASOPHILS NFR BLD AUTO: 1.4 % (ref 0–2)
BILIRUB DIRECT SERPL-MCNC: 0.2 MG/DL (ref 0–0.4)
BILIRUB SERPL-MCNC: 0.2 MG/DL (ref 0.2–1.3)
BILIRUB UR QL STRIP: NEGATIVE
BUN SERPL-MCNC: 10 MG/DL (ref 7–20)
CALCIUM: 9.8 MG/DL (ref 8.4–10.2)
CHLORIDE SERPL-SCNC: 102 MMOL/L (ref 98–107)
CO2 SERPL-SCNC: 26 MMOL/L (ref 22–30)
EOSINOPHIL # BLD AUTO: 0.2 10^3/UL (ref 0–0.6)
EOSINOPHIL NFR BLD AUTO: 2.3 % (ref 0–6)
ERYTHROCYTE [DISTWIDTH] IN BLOOD BY AUTOMATED COUNT: 15.6 % (ref 11.5–14)
GLUCOSE SERPL-MCNC: 82 MG/DL (ref 75–110)
GLUCOSE UR STRIP-MCNC: NEGATIVE MG/DL
HCT VFR BLD CALC: 41.2 % (ref 36–47)
HGB BLD-MCNC: 13.6 G/DL (ref 12–15.5)
KETONES UR STRIP-MCNC: NEGATIVE MG/DL
LIPASE SERPL-CCNC: 121 U/L (ref 23–300)
LYMPHOCYTES # BLD AUTO: 3.2 10^3/UL (ref 0.5–4.7)
LYMPHOCYTES NFR BLD AUTO: 41.2 % (ref 13–45)
MCH RBC QN AUTO: 29.3 PG (ref 27–33.4)
MCHC RBC AUTO-ENTMCNC: 33 G/DL (ref 32–36)
MCV RBC AUTO: 89 FL (ref 80–97)
METHADONE UR QL SCN: NEGATIVE
MONOCYTES # BLD AUTO: 0.4 10^3/UL (ref 0.1–1.4)
MONOCYTES NFR BLD AUTO: 5.2 % (ref 3–13)
NEUTROPHILS # BLD AUTO: 3.9 10^3/UL (ref 1.7–8.2)
NEUTS SEG NFR BLD AUTO: 49.9 % (ref 42–78)
NITRITE UR QL STRIP: POSITIVE
PCP UR QL SCN: NEGATIVE
PH UR STRIP: 6 [PH] (ref 5–9)
PLATELET # BLD: 433 10^3/UL (ref 150–450)
POTASSIUM SERPL-SCNC: 4.4 MMOL/L (ref 3.6–5)
PROT SERPL-MCNC: 7.7 G/DL (ref 6.3–8.2)
PROT UR STRIP-MCNC: NEGATIVE MG/DL
RBC # BLD AUTO: 4.64 10^6/UL (ref 3.72–5.28)
SODIUM SERPL-SCNC: 139.4 MMOL/L (ref 137–145)
SP GR UR STRIP: 1.02
TOTAL CELLS COUNTED % (AUTO): 100 %
URINE AMPHETAMINES SCREEN: NEGATIVE
URINE BENZODIAZEPINES SCREEN: NEGATIVE
URINE COCAINE SCREEN: NEGATIVE
URINE MARIJUANA (THC) SCREEN: NEGATIVE
UROBILINOGEN UR-MCNC: NEGATIVE MG/DL (ref ?–2)
WBC # BLD AUTO: 7.7 10^3/UL (ref 4–10.5)

## 2018-01-23 PROCEDURE — 83690 ASSAY OF LIPASE: CPT

## 2018-01-23 PROCEDURE — 84703 CHORIONIC GONADOTROPIN ASSAY: CPT

## 2018-01-23 PROCEDURE — 85025 COMPLETE CBC W/AUTO DIFF WBC: CPT

## 2018-01-23 PROCEDURE — 80307 DRUG TEST PRSMV CHEM ANLYZR: CPT

## 2018-01-23 PROCEDURE — 80053 COMPREHEN METABOLIC PANEL: CPT

## 2018-01-23 PROCEDURE — 99284 EMERGENCY DEPT VISIT MOD MDM: CPT

## 2018-01-23 PROCEDURE — 81001 URINALYSIS AUTO W/SCOPE: CPT

## 2018-01-23 PROCEDURE — 36415 COLL VENOUS BLD VENIPUNCTURE: CPT

## 2018-01-23 NOTE — ER DOCUMENT REPORT
ED GI/





- General


Mode of Arrival: Ambulatory


Information source: Patient


TRAVEL OUTSIDE OF THE [place] IN LAST 30 DAYS: No





- HPI


Patient complains to provider of: Abdominal pain


Onset: Other - 3-4 days ago


Associated symptoms: Other - see notes above





<TABATHA DUGGAN - Last Filed: 18 17:40>





<EFE FARIA - Last Filed: 18 10:54>





- General


Chief Complaint: Abdominal Pain


Stated Complaint: ABDOMINAL PAIN, POSSIBLE HIGH HEARTRATE


Time Seen by Provider: 18 13:54


Notes: 





32 year old female presents to the ED complaining of LUQ abdominal pain that 

started 3-4 days ago. Patient additionally complains of having heart burn with 

a burning sensation to her throat and mouth and an increased heart rate. 

Patient has been vomiting for the past 2 days, and denies any constipation, 

burning with urination, or vaginal discharge. Patient had a normal bowel 

movement yesterday. Patient's abdominal pain does not radiate, but last night 

she experienced severe pain to the right side to the point that she was unable 

to roll over.





Patient has tried Pepsid, pepto bismol, and omeprazole to no relief. (TABATHA DUGGAN)





- Related Data


Allergies/Adverse Reactions: 


 





latex [Latex] Allergy (Severe, Verified 17 13:01)


 swelling,burning


trazodone [Trazodone] Allergy (Intermediate, Verified 17 13:01)


 Hives


ibuprofen Allergy (Verified 17 13:01)


 


ketorolac [From Toradol] Allergy (Verified 17 13:01)


 


tramadol [Tramadol] Allergy (Verified 17 13:01)


 


ondansetron HCl [From Zofran] Adverse Reaction (Intermediate, Verified 17 

13:01)


 GI upset


hydrocodone bitartrate [From Vicodin] Adverse Reaction (Verified 17 13:01)


 GI upset











Past Medical History





- General


Information source: Patient





- Social History


Smoking Status: Current Every Day Smoker


Chew tobacco use (# tins/day): No


Frequency of alcohol use: None


Drug Abuse: None


Family History: Arthritis, CAD, DM, Hyperlipidemia, Hypertension, Malignancy, 

Thyroid Disfunction


Patient has suicidal ideation: No


Patient has homicidal ideation: No





- Past Medical History


Cardiac Medical History: 


   Denies: Hx Pulmonary Embolism


Pulmonary Medical History: Reports: Hx Asthma - exercise induced


   Denies: Hx Sleep Apnea, Hx Tuberculosis


Renal/ Medical History: Reports: Hx Ovarian Cysts - PCOS.  Denies: Hx 

Peritoneal Dialysis


GI Medical History: Reports: Hx Gastroesophageal Reflux Disease, Hx Ulcer


Musculoskeltal Medical History: Reports Hx Musculoskeletal Deformity - Scoliosis

, Reports Hx Musculoskeletal Trauma - Sprains of knee and hip


Psychiatric Medical History: Reports: Hx Attention Deficit Hyperactivity 

Disorder, Hx Bipolar Disorder - age 10, Hx Personality Disorder, Hx Post 

Traumatic Stress Disorder, Hx Schizophrenia


   Denies: Hx Depression


Past Surgical History: Reports: Hx Breast Surgery - implants , Hx Cardiac 

Surgery, Hx  Section - x2, Hx Tubal Ligation.  Denies: Hx Hysterectomy, 

Hx Pacemaker





- Immunizations


Immunizations up to date: Yes


Hx Diphtheria, Pertussis, Tetanus Vaccination: Yes - received in 


Hx Pneumococcal Vaccination: 12





<TABATHA DUGGAN - Last Filed: 18 17:40>





Review of Systems





- Review of Systems


Constitutional: No symptoms reported


EENT: No symptoms reported


Cardiovascular: See HPI, Heart racing


Respiratory: No symptoms reported


Gastrointestinal: See HPI, Abdominal pain, Vomiting, Last bowel movement - 

yesterday (normal), Other - heartburn.  denies: Constipation, Black stools


Genitourinary: No symptoms reported.  denies: Burning


Female Genitourinary: No symptoms reported.  denies: Vaginal discharge


Musculoskeletal: No symptoms reported


Skin: No symptoms reported


Hematologic/Lymphatic: No symptoms reported


Neurological/Psychological: No symptoms reported


-: Yes All other systems reviewed and negative





<TABATHA DUGGAN - Last Filed: 18 17:40>





Physical Exam





- General


General appearance: Alert


In distress: None





- HEENT


Head: Normocephalic, Atraumatic


Eyes: Normal


Extraocular movements intact: Yes


Pupils: PERRL





- Respiratory


Respiratory status: No respiratory distress


Breath sounds: Normal





- Cardiovascular


Rhythm: Regular


Heart sounds: Normal auscultation





- Abdominal


Inspection: Normal


Distension: No distension


Bowel sounds: Normal


Tenderness: Tender - LUQ tenderness to palpation





- Back


Back: Normal





- Extremities


General upper extremity: Normal inspection, Normal ROM


General lower extremity: Normal inspection, Normal ROM





- Neurological


Neuro grossly intact: Yes


Cognition: Normal


Orientation: AAOx4


[place] Coma Scale Eye Opening: Spontaneous


[place] Coma Scale Verbal: Oriented


[place] Coma Scale Motor: Obeys Commands


[place] Coma Scale Total: 15


Speech: Normal





- Psychological


Associated symptoms: Normal affect, Normal mood





- Skin


Skin Temperature: Warm


Skin Moisture: Dry


Skin Color: Normal





<TABATHA DUGGAN - Last Filed: 18 17:40>





- Vital signs


Vitals: 


 











Temp Pulse Resp BP Pulse Ox


 


 98.8 F   96   20   100/65   100 


 


 18 12:55  18 12:55  18 12:55  18 12:55  18 12:55














Course





- Laboratory


Result Diagrams: 


 18 14:57





 18 14:57





<TABATHA DUGGAN - Last Filed: 18 17:40>





- Laboratory


Result Diagrams: 


 18 14:57





 18 14:57





<EFE FARIA - Last Filed: 18 10:54>





- Vital Signs


Vital signs: 


 











Temp Pulse Resp BP Pulse Ox


 


 97.9 F   88   20   114/77   100 


 


 18 17:44  18 17:44  18 17:44  18 17:44  18 17:44














- Laboratory


Laboratory results interpreted by me: 


 











  18





  14:57 14:57


 


RDW  15.6 H 


 


Urine Nitrite   POSITIVE H














Discharge





<TABATHA DUGGAN - Last Filed: 18 17:40>





<EFE FARIA - Last Filed: 18 10:54>





- Discharge


Clinical Impression: 


 Gastritis





Condition: Good


Disposition: HOME, SELF-CARE


Instructions:  Family Physicians / Practices, Gastritis ([facility])


Additional Instructions: 


You need to follow-up with the primary care physician with the referral list 

provided for reevaluation within the next week.  Please return to emergency 

department if symptoms are not improving over the next week


Prescriptions: 


Magnesium Hydroxide [Milk of Magnesia] 400 mg PO QID 30 Days #1 bottle


Promethazine HCl 25 mg PO Q6 PRN #30 tablet


 PRN Reason: 


Ranitidine HCl [Zantac] 150 mg PO BID 30 Days #60 tablet


Scribe Attestation: 





18 10:54


I personally performed the services described in the documentation, reviewed 

and edited the documentation which was dictated to describe my presence, and it 

accurately records my words and actions (EFE FARIA)





Scribe Documentation





- Scribe


Written by Julia:: Julia Buchanan, 2018 1432


acting as scribe for :: Edgard





<TABATHA DUGGAN - Last Filed: 18 17:40>